# Patient Record
Sex: FEMALE | Race: WHITE | NOT HISPANIC OR LATINO | Employment: UNEMPLOYED | URBAN - METROPOLITAN AREA
[De-identification: names, ages, dates, MRNs, and addresses within clinical notes are randomized per-mention and may not be internally consistent; named-entity substitution may affect disease eponyms.]

---

## 2018-01-16 ENCOUNTER — OFFICE VISIT CONVERTED (OUTPATIENT)
Dept: NEUROSURGERY | Facility: CLINIC | Age: 53
End: 2018-01-16
Attending: NEUROLOGICAL SURGERY

## 2019-07-25 ENCOUNTER — HOSPITAL ENCOUNTER (OUTPATIENT)
Dept: GENERAL RADIOLOGY | Facility: HOSPITAL | Age: 54
Discharge: HOME | End: 2019-07-25
Attending: NURSE PRACTITIONER

## 2020-05-14 ENCOUNTER — HOSPITAL ENCOUNTER (OUTPATIENT)
Dept: GENERAL RADIOLOGY | Facility: HOSPITAL | Age: 55
Discharge: HOME | End: 2020-05-14
Attending: NURSE PRACTITIONER

## 2020-05-14 LAB
ALBUMIN SERPL-MCNC: 4.4 G/DL (ref 3.5–5)
ALBUMIN/GLOB SERPL: 1.5 {RATIO} (ref 1.4–2.6)
ALP SERPL-CCNC: 86 U/L (ref 53–141)
ALT SERPL-CCNC: 14 U/L (ref 10–40)
ANION GAP SERPL CALC-SCNC: 16 MMOL/L (ref 8–19)
AST SERPL-CCNC: 21 U/L (ref 15–50)
BILIRUB SERPL-MCNC: <0.15 MG/DL (ref 0.2–1.3)
BUN SERPL-MCNC: 13 MG/DL (ref 5–25)
BUN/CREAT SERPL: 20 {RATIO} (ref 6–20)
CALCIUM SERPL-MCNC: 9.5 MG/DL (ref 8.7–10.4)
CHLORIDE SERPL-SCNC: 105 MMOL/L (ref 99–111)
CONV CO2: 26 MMOL/L (ref 22–32)
CONV TOTAL PROTEIN: 7.3 G/DL (ref 6.3–8.2)
CREAT UR-MCNC: 0.65 MG/DL (ref 0.5–0.9)
GFR SERPLBLD BASED ON 1.73 SQ M-ARVRAT: >60 ML/MIN/{1.73_M2}
GLOBULIN UR ELPH-MCNC: 2.9 G/DL (ref 2–3.5)
GLUCOSE SERPL-MCNC: 105 MG/DL (ref 65–99)
OSMOLALITY SERPL CALC.SUM OF ELEC: 296 MOSM/KG (ref 273–304)
POTASSIUM SERPL-SCNC: 3.5 MMOL/L (ref 3.5–5.3)
SODIUM SERPL-SCNC: 143 MMOL/L (ref 135–147)

## 2020-12-03 ENCOUNTER — HOSPITAL ENCOUNTER (OUTPATIENT)
Dept: GENERAL RADIOLOGY | Facility: HOSPITAL | Age: 55
Discharge: HOME | End: 2020-12-03
Attending: NURSE PRACTITIONER

## 2021-02-24 ENCOUNTER — HOSPITAL ENCOUNTER (OUTPATIENT)
Dept: GENERAL RADIOLOGY | Facility: HOSPITAL | Age: 56
Discharge: HOME | End: 2021-02-24
Attending: NURSE PRACTITIONER

## 2021-02-24 LAB
ALBUMIN SERPL-MCNC: 4.2 G/DL (ref 3.5–5)
ALBUMIN/GLOB SERPL: 1.4 {RATIO} (ref 1.4–2.6)
ALP SERPL-CCNC: 109 U/L (ref 53–141)
ALT SERPL-CCNC: 9 U/L (ref 10–40)
ANION GAP SERPL CALC-SCNC: 17 MMOL/L (ref 8–19)
AST SERPL-CCNC: 19 U/L (ref 15–50)
BASOPHILS # BLD AUTO: 0.06 10*3/UL (ref 0–0.2)
BASOPHILS NFR BLD AUTO: 0.9 % (ref 0–3)
BILIRUB SERPL-MCNC: 0.16 MG/DL (ref 0.2–1.3)
BUN SERPL-MCNC: 22 MG/DL (ref 5–25)
BUN/CREAT SERPL: 28 {RATIO} (ref 6–20)
CALCIUM SERPL-MCNC: 9.6 MG/DL (ref 8.7–10.4)
CHLORIDE SERPL-SCNC: 101 MMOL/L (ref 99–111)
CONV ABS IMM GRAN: 0.02 10*3/UL (ref 0–0.2)
CONV CO2: 27 MMOL/L (ref 22–32)
CONV IMMATURE GRAN: 0.3 % (ref 0–1.8)
CONV TOTAL PROTEIN: 7.1 G/DL (ref 6.3–8.2)
CREAT UR-MCNC: 0.78 MG/DL (ref 0.5–0.9)
DEPRECATED RDW RBC AUTO: 40.1 FL (ref 36.4–46.3)
EOSINOPHIL # BLD AUTO: 0.2 10*3/UL (ref 0–0.7)
EOSINOPHIL # BLD AUTO: 2.9 % (ref 0–7)
ERYTHROCYTE [DISTWIDTH] IN BLOOD BY AUTOMATED COUNT: 12.5 % (ref 11.7–14.4)
GFR SERPLBLD BASED ON 1.73 SQ M-ARVRAT: >60 ML/MIN/{1.73_M2}
GLOBULIN UR ELPH-MCNC: 2.9 G/DL (ref 2–3.5)
GLUCOSE SERPL-MCNC: 92 MG/DL (ref 65–99)
HCT VFR BLD AUTO: 37.6 % (ref 37–47)
HGB BLD-MCNC: 12.1 G/DL (ref 12–16)
LYMPHOCYTES # BLD AUTO: 2.97 10*3/UL (ref 1–5)
LYMPHOCYTES NFR BLD AUTO: 43.6 % (ref 20–45)
MCH RBC QN AUTO: 28.5 PG (ref 27–31)
MCHC RBC AUTO-ENTMCNC: 32.2 G/DL (ref 33–37)
MCV RBC AUTO: 88.5 FL (ref 81–99)
MONOCYTES # BLD AUTO: 0.48 10*3/UL (ref 0.2–1.2)
MONOCYTES NFR BLD AUTO: 7 % (ref 3–10)
NEUTROPHILS # BLD AUTO: 3.08 10*3/UL (ref 2–8)
NEUTROPHILS NFR BLD AUTO: 45.3 % (ref 30–85)
NRBC CBCN: 0 % (ref 0–0.7)
OSMOLALITY SERPL CALC.SUM OF ELEC: 295 MOSM/KG (ref 273–304)
PLATELET # BLD AUTO: 343 10*3/UL (ref 130–400)
PMV BLD AUTO: 9.6 FL (ref 9.4–12.3)
POTASSIUM SERPL-SCNC: 3.8 MMOL/L (ref 3.5–5.3)
RBC # BLD AUTO: 4.25 10*6/UL (ref 4.2–5.4)
SODIUM SERPL-SCNC: 141 MMOL/L (ref 135–147)
T4 FREE SERPL-MCNC: 0.9 NG/DL (ref 0.9–1.8)
TSH SERPL-ACNC: 1.83 M[IU]/L (ref 0.27–4.2)
WBC # BLD AUTO: 6.81 10*3/UL (ref 4.8–10.8)

## 2021-04-14 ENCOUNTER — HOSPITAL ENCOUNTER (OUTPATIENT)
Dept: GENERAL RADIOLOGY | Facility: HOSPITAL | Age: 56
Discharge: HOME | End: 2021-04-14
Attending: NURSE PRACTITIONER

## 2021-04-14 LAB
CHOLEST SERPL-MCNC: 221 MG/DL (ref 107–200)
CHOLEST/HDLC SERPL: 4.3 {RATIO} (ref 3–6)
HDLC SERPL-MCNC: 51 MG/DL (ref 40–60)
LDLC SERPL CALC-MCNC: 123 MG/DL (ref 70–100)
TRIGL SERPL-MCNC: 234 MG/DL (ref 40–150)
VLDLC SERPL-MCNC: 47 MG/DL (ref 5–37)

## 2021-05-16 VITALS
WEIGHT: 209 LBS | BODY MASS INDEX: 33.59 KG/M2 | HEIGHT: 66 IN | DIASTOLIC BLOOD PRESSURE: 87 MMHG | SYSTOLIC BLOOD PRESSURE: 126 MMHG

## 2022-05-24 ENCOUNTER — TRANSCRIBE ORDERS (OUTPATIENT)
Dept: ADMINISTRATIVE | Facility: HOSPITAL | Age: 57
End: 2022-05-24

## 2022-05-24 DIAGNOSIS — M54.40 LOW BACK PAIN WITH SCIATICA, SCIATICA LATERALITY UNSPECIFIED, UNSPECIFIED BACK PAIN LATERALITY, UNSPECIFIED CHRONICITY: Primary | ICD-10-CM

## 2022-06-17 ENCOUNTER — APPOINTMENT (OUTPATIENT)
Dept: MRI IMAGING | Facility: HOSPITAL | Age: 57
End: 2022-06-17

## 2023-09-23 ENCOUNTER — HOSPITAL ENCOUNTER (EMERGENCY)
Facility: HOSPITAL | Age: 58
Discharge: HOME | End: 2023-09-23
Attending: EMERGENCY MEDICINE
Payer: COMMERCIAL

## 2023-09-23 ENCOUNTER — APPOINTMENT (OUTPATIENT)
Dept: GENERAL RADIOLOGY | Facility: HOSPITAL | Age: 58
End: 2023-09-23
Payer: COMMERCIAL

## 2023-09-23 ENCOUNTER — APPOINTMENT (OUTPATIENT)
Dept: CT IMAGING | Facility: HOSPITAL | Age: 58
End: 2023-09-23
Payer: COMMERCIAL

## 2023-09-23 VITALS
OXYGEN SATURATION: 92 % | WEIGHT: 215 LBS | HEIGHT: 66 IN | DIASTOLIC BLOOD PRESSURE: 82 MMHG | BODY MASS INDEX: 34.55 KG/M2 | SYSTOLIC BLOOD PRESSURE: 131 MMHG | RESPIRATION RATE: 20 BRPM | TEMPERATURE: 98.9 F | HEART RATE: 90 BPM

## 2023-09-23 DIAGNOSIS — R07.89 CHEST WALL PAIN: ICD-10-CM

## 2023-09-23 DIAGNOSIS — R07.89 CHEST DISCOMFORT: ICD-10-CM

## 2023-09-23 DIAGNOSIS — U07.1 COVID-19: ICD-10-CM

## 2023-09-23 DIAGNOSIS — R55 SYNCOPE, UNSPECIFIED SYNCOPE TYPE: Primary | ICD-10-CM

## 2023-09-23 DIAGNOSIS — F14.90 COCAINE USE: ICD-10-CM

## 2023-09-23 LAB
ALBUMIN SERPL-MCNC: 3.9 G/DL (ref 3.5–5.2)
ALBUMIN/GLOB SERPL: 1.5 G/DL
ALP SERPL-CCNC: 120 U/L (ref 39–117)
ALT SERPL W P-5'-P-CCNC: 11 U/L (ref 1–33)
AMPHET+METHAMPHET UR QL: NEGATIVE
ANION GAP SERPL CALCULATED.3IONS-SCNC: 11.9 MMOL/L (ref 5–15)
AST SERPL-CCNC: 19 U/L (ref 1–32)
BARBITURATES UR QL SCN: NEGATIVE
BASOPHILS # BLD AUTO: 0.04 10*3/MM3 (ref 0–0.2)
BASOPHILS NFR BLD AUTO: 0.4 % (ref 0–1.5)
BENZODIAZ UR QL SCN: POSITIVE
BILIRUB SERPL-MCNC: 0.2 MG/DL (ref 0–1.2)
BUN SERPL-MCNC: 11 MG/DL (ref 6–20)
BUN/CREAT SERPL: 14.5 (ref 7–25)
CALCIUM SPEC-SCNC: 9 MG/DL (ref 8.6–10.5)
CANNABINOIDS SERPL QL: NEGATIVE
CHLORIDE SERPL-SCNC: 105 MMOL/L (ref 98–107)
CO2 SERPL-SCNC: 23.1 MMOL/L (ref 22–29)
COCAINE UR QL: POSITIVE
CREAT SERPL-MCNC: 0.76 MG/DL (ref 0.57–1)
D DIMER PPP FEU-MCNC: 0.7 MCGFEU/ML (ref 0–0.58)
DEPRECATED RDW RBC AUTO: 45.1 FL (ref 37–54)
EGFRCR SERPLBLD CKD-EPI 2021: 91 ML/MIN/1.73
EOSINOPHIL # BLD AUTO: 0.06 10*3/MM3 (ref 0–0.4)
EOSINOPHIL NFR BLD AUTO: 0.6 % (ref 0.3–6.2)
ERYTHROCYTE [DISTWIDTH] IN BLOOD BY AUTOMATED COUNT: 13.9 % (ref 12.3–15.4)
FENTANYL UR-MCNC: NEGATIVE NG/ML
FLUAV AG NPH QL: NEGATIVE
FLUBV AG NPH QL IA: NEGATIVE
GEN 5 2HR TROPONIN T REFLEX: 7 NG/L
GLOBULIN UR ELPH-MCNC: 2.6 GM/DL
GLUCOSE SERPL-MCNC: 116 MG/DL (ref 65–99)
HCT VFR BLD AUTO: 34.8 % (ref 34–46.6)
HGB BLD-MCNC: 11.1 G/DL (ref 12–15.9)
HOLD SPECIMEN: NORMAL
HOLD SPECIMEN: NORMAL
IMM GRANULOCYTES # BLD AUTO: 0.03 10*3/MM3 (ref 0–0.05)
IMM GRANULOCYTES NFR BLD AUTO: 0.3 % (ref 0–0.5)
LIPASE SERPL-CCNC: 17 U/L (ref 13–60)
LYMPHOCYTES # BLD AUTO: 2.26 10*3/MM3 (ref 0.7–3.1)
LYMPHOCYTES NFR BLD AUTO: 23.3 % (ref 19.6–45.3)
MAGNESIUM SERPL-MCNC: 1.8 MG/DL (ref 1.6–2.6)
MCH RBC QN AUTO: 28.4 PG (ref 26.6–33)
MCHC RBC AUTO-ENTMCNC: 31.9 G/DL (ref 31.5–35.7)
MCV RBC AUTO: 89 FL (ref 79–97)
METHADONE UR QL SCN: NEGATIVE
MONOCYTES # BLD AUTO: 0.34 10*3/MM3 (ref 0.1–0.9)
MONOCYTES NFR BLD AUTO: 3.5 % (ref 5–12)
NEUTROPHILS NFR BLD AUTO: 6.99 10*3/MM3 (ref 1.7–7)
NEUTROPHILS NFR BLD AUTO: 71.9 % (ref 42.7–76)
NRBC BLD AUTO-RTO: 0 /100 WBC (ref 0–0.2)
NT-PROBNP SERPL-MCNC: 178.7 PG/ML (ref 0–900)
OPIATES UR QL: NEGATIVE
OXYCODONE UR QL SCN: NEGATIVE
PLATELET # BLD AUTO: 201 10*3/MM3 (ref 140–450)
PMV BLD AUTO: 8.9 FL (ref 6–12)
POTASSIUM SERPL-SCNC: 3.6 MMOL/L (ref 3.5–5.2)
PROT SERPL-MCNC: 6.5 G/DL (ref 6–8.5)
QT INTERVAL: 362 MS
QT INTERVAL: 375 MS
QTC INTERVAL: 472 MS
QTC INTERVAL: 473 MS
RBC # BLD AUTO: 3.91 10*6/MM3 (ref 3.77–5.28)
SARS-COV-2 RNA RESP QL NAA+PROBE: DETECTED
SODIUM SERPL-SCNC: 140 MMOL/L (ref 136–145)
TROPONIN T DELTA: 0 NG/L
TROPONIN T SERPL HS-MCNC: 7 NG/L
WBC NRBC COR # BLD: 9.72 10*3/MM3 (ref 3.4–10.8)
WHOLE BLOOD HOLD COAG: NORMAL
WHOLE BLOOD HOLD SPECIMEN: NORMAL

## 2023-09-23 PROCEDURE — 84484 ASSAY OF TROPONIN QUANT: CPT

## 2023-09-23 PROCEDURE — 93005 ELECTROCARDIOGRAM TRACING: CPT | Performed by: EMERGENCY MEDICINE

## 2023-09-23 PROCEDURE — 80307 DRUG TEST PRSMV CHEM ANLYZR: CPT | Performed by: EMERGENCY MEDICINE

## 2023-09-23 PROCEDURE — 87804 INFLUENZA ASSAY W/OPTIC: CPT | Performed by: EMERGENCY MEDICINE

## 2023-09-23 PROCEDURE — 36415 COLL VENOUS BLD VENIPUNCTURE: CPT

## 2023-09-23 PROCEDURE — 93005 ELECTROCARDIOGRAM TRACING: CPT

## 2023-09-23 PROCEDURE — 71045 X-RAY EXAM CHEST 1 VIEW: CPT

## 2023-09-23 PROCEDURE — 87635 SARS-COV-2 COVID-19 AMP PRB: CPT | Performed by: EMERGENCY MEDICINE

## 2023-09-23 PROCEDURE — 99285 EMERGENCY DEPT VISIT HI MDM: CPT

## 2023-09-23 PROCEDURE — 25510000001 IOPAMIDOL PER 1 ML: Performed by: EMERGENCY MEDICINE

## 2023-09-23 PROCEDURE — 83880 ASSAY OF NATRIURETIC PEPTIDE: CPT

## 2023-09-23 PROCEDURE — 85025 COMPLETE CBC W/AUTO DIFF WBC: CPT

## 2023-09-23 PROCEDURE — 83735 ASSAY OF MAGNESIUM: CPT

## 2023-09-23 PROCEDURE — 83690 ASSAY OF LIPASE: CPT

## 2023-09-23 PROCEDURE — 85379 FIBRIN DEGRADATION QUANT: CPT | Performed by: EMERGENCY MEDICINE

## 2023-09-23 PROCEDURE — 71260 CT THORAX DX C+: CPT

## 2023-09-23 PROCEDURE — 84484 ASSAY OF TROPONIN QUANT: CPT | Performed by: EMERGENCY MEDICINE

## 2023-09-23 PROCEDURE — 80053 COMPREHEN METABOLIC PANEL: CPT

## 2023-09-23 RX ORDER — SODIUM CHLORIDE 0.9 % (FLUSH) 0.9 %
10 SYRINGE (ML) INJECTION AS NEEDED
Status: DISCONTINUED | OUTPATIENT
Start: 2023-09-23 | End: 2023-09-24 | Stop reason: HOSPADM

## 2023-09-23 RX ORDER — ALBUTEROL SULFATE 90 UG/1
2 AEROSOL, METERED RESPIRATORY (INHALATION) EVERY 4 HOURS PRN
Qty: 1 G | Refills: 0 | Status: SHIPPED | OUTPATIENT
Start: 2023-09-23 | End: 2026-04-06

## 2023-09-23 RX ORDER — BENZONATATE 100 MG/1
200 CAPSULE ORAL 3 TIMES DAILY PRN
Qty: 30 CAPSULE | Refills: 0 | Status: SHIPPED | OUTPATIENT
Start: 2023-09-23 | End: 2023-10-03

## 2023-09-23 RX ORDER — ALBUTEROL SULFATE 90 UG/1
2 AEROSOL, METERED RESPIRATORY (INHALATION) EVERY 4 HOURS PRN
Qty: 1 G | Refills: 0 | Status: SHIPPED | OUTPATIENT
Start: 2023-09-23 | End: 2023-09-23 | Stop reason: SDUPTHER

## 2023-09-23 RX ORDER — ASPIRIN 81 MG/1
324 TABLET, CHEWABLE ORAL ONCE
Status: COMPLETED | OUTPATIENT
Start: 2023-09-23 | End: 2023-09-23

## 2023-09-23 RX ORDER — BENZONATATE 100 MG/1
200 CAPSULE ORAL 3 TIMES DAILY PRN
Qty: 30 CAPSULE | Refills: 0 | Status: SHIPPED | OUTPATIENT
Start: 2023-09-23 | End: 2023-09-23 | Stop reason: SDUPTHER

## 2023-09-23 RX ADMIN — ASPIRIN 324 MG: 81 TABLET, CHEWABLE ORAL at 19:37

## 2023-09-23 RX ADMIN — IOPAMIDOL 100 ML: 755 INJECTION, SOLUTION INTRAVENOUS at 22:01

## 2023-09-23 NOTE — ED PROVIDER NOTES
Time: 7:20 PM EDT  Date of encounter:  9/23/2023  Independent Historian/Clinical History and Information was obtained by:   Patient  Chief Complaint: Syncope    History is limited by: N/A    History of Present Illness:  Patient is a 58 y.o. year old female who presents to the emergency department for evaluation of syncope.  The patient notes that she was walking to her kitchen.  The patient started with a coughing spell the patient stated that she coughed so hard that she then lost consciousness.  The patient states she fell to the ground.  She felt that it was transient.  There is just seconds of loss of consciousness.  Patient states that she did injure her left knee.  She has no other injury.  Patient does not complain of left knee pain at this time.  She does note that she has had upper respiratory symptoms now for several weeks.  She this includes cough, shortness of breath with exertion chest pain with cough.  She states she only has chest pain with cough.  Patient was a little bit nauseated.  The patient denies any unusual fatigue or diaphoresis.  Patient denies any abdominal pain.  The patient's had no vomiting.  The patient's had no diarrhea.  Patient's had no prior DVT or pulmonary embolism.  The patient's had no previous surgery in the last 6 to 8 weeks.  The patient has no clinical signs of DVT such as unilateral leg swelling or leg pain.  The patient does not have active cancer at this time.  The patient's not on estrogen.  The patient does note that she supposed be on medicine for hypertension and cholesterol but she does not take them.  She is not a diabetic.  She was never smoker she does have family history of coronary disease    HPI    Patient Care Team  Primary Care Provider: Kassidy Acharya APRN    Past Medical History:     No Known Allergies  Past Medical History:   Diagnosis Date    Anxiety and depression     Back pain      Past Surgical History:   Procedure Laterality Date     HYSTERECTOMY      SPINAL FUSION       History reviewed. No pertinent family history.    Home Medications:  Prior to Admission medications    Medication Sig Start Date End Date Taking? Authorizing Provider   ALPRAZolam (Xanax) 0.25 MG tablet Xanax 0.25 mg oral tablet take 1 tablet (0.25 mg) by oral route 3 times per day   Active    Emergency, Nurse Kendrick RN   diclofenac (VOLTAREN) 75 MG EC tablet Take 1 tablet by mouth 2 (Two) Times a Day As Needed (Pain). 6/15/22   Mary Ann Burns APRN   Omega-3 Fatty Acids (fish oil) 1000 MG capsule capsule Take  by mouth.    Emergency, Nurse Kendrick RN   venlafaxine XR (EFFEXOR-XR) 150 MG 24 hr capsule venlafaxine 150 mg oral capsule,extended release 24hr take 1 capsule (150 mg) by oral route once daily   Active    Emergency, Nurse Kendrick RN        Social History:   Social History     Tobacco Use    Smoking status: Never    Smokeless tobacco: Never   Vaping Use    Vaping Use: Never used   Substance Use Topics    Alcohol use: Not Currently         Review of Systems:  Review of Systems   Constitutional:  Negative for chills, diaphoresis and fever.   HENT:  Negative for congestion, postnasal drip, rhinorrhea and sore throat.    Eyes:  Negative for photophobia.   Respiratory:  Positive for cough and shortness of breath. Negative for chest tightness.    Cardiovascular:  Positive for chest pain. Negative for palpitations and leg swelling.   Gastrointestinal:  Positive for nausea. Negative for abdominal pain, diarrhea and vomiting.   Genitourinary:  Negative for difficulty urinating, dysuria, flank pain, frequency, hematuria and urgency.   Musculoskeletal:  Negative for neck pain and neck stiffness.   Skin:  Negative for pallor and rash.   Neurological:  Positive for syncope. Negative for dizziness, weakness, numbness and headaches.   Hematological:  Negative for adenopathy. Does not bruise/bleed easily.   Psychiatric/Behavioral: Negative.        Physical Exam:  /82 (BP  "Location: Right arm)   Pulse 90   Temp 98.9 °F (37.2 °C) (Oral)   Resp 20   Ht 167.6 cm (66\")   Wt 97.5 kg (215 lb)   SpO2 92%   BMI 34.70 kg/m²     Physical Exam  Vitals and nursing note reviewed.   Constitutional:       General: She is not in acute distress.     Appearance: Normal appearance. She is not ill-appearing, toxic-appearing or diaphoretic.   HENT:      Head: Normocephalic and atraumatic.      Mouth/Throat:      Mouth: Mucous membranes are moist.   Eyes:      Pupils: Pupils are equal, round, and reactive to light.   Cardiovascular:      Rate and Rhythm: Normal rate and regular rhythm.      Pulses: Normal pulses.           Carotid pulses are 2+ on the right side and 2+ on the left side.       Radial pulses are 2+ on the right side and 2+ on the left side.        Femoral pulses are 2+ on the right side and 2+ on the left side.       Popliteal pulses are 2+ on the right side and 2+ on the left side.        Dorsalis pedis pulses are 2+ on the right side and 2+ on the left side.        Posterior tibial pulses are 2+ on the right side and 2+ on the left side.      Heart sounds: Normal heart sounds. No murmur heard.  Pulmonary:      Effort: Pulmonary effort is normal. No accessory muscle usage, respiratory distress or retractions.      Breath sounds: Normal breath sounds. Examination of the right-upper field reveals decreased breath sounds. Examination of the left-upper field reveals decreased breath sounds. Examination of the right-middle field reveals decreased breath sounds. Examination of the left-middle field reveals decreased breath sounds. Examination of the right-lower field reveals decreased breath sounds. Examination of the left-lower field reveals decreased breath sounds. No wheezing, rhonchi or rales.   Chest:      Chest wall: Tenderness present. No mass.          Comments: The patient's chest pain is reproduced with cough, deep breath and palpation of the anterior chest wall  Abdominal:      " General: Abdomen is flat. There is no distension.      Palpations: Abdomen is soft. There is no mass or pulsatile mass.      Tenderness: There is no abdominal tenderness. There is no right CVA tenderness, left CVA tenderness, guarding or rebound.      Comments: No rigidity   Musculoskeletal:         General: No swelling, tenderness or deformity.      Cervical back: Neck supple. No tenderness.      Right lower leg: No tenderness. No edema.      Left lower leg: No tenderness. No edema.   Skin:     General: Skin is warm and dry.      Capillary Refill: Capillary refill takes less than 2 seconds.      Coloration: Skin is not jaundiced or pale.      Findings: No erythema.   Neurological:      General: No focal deficit present.      Mental Status: She is alert and oriented to person, place, and time. Mental status is at baseline.      Cranial Nerves: Cranial nerves 2-12 are intact. No cranial nerve deficit.      Sensory: Sensation is intact. No sensory deficit.      Motor: Motor function is intact. No weakness or pronator drift.      Coordination: Coordination is intact. Coordination normal.   Psychiatric:         Mood and Affect: Mood normal.         Behavior: Behavior normal.                Procedures:  Procedures      Medical Decision Making:      Comorbidities that affect care:    Anxiety, depression, chronic back pain, hypertension, high cholesterol    External Notes reviewed:    None      The following orders were placed and all results were independently analyzed by me:  Orders Placed This Encounter   Procedures    COVID-19,CEPHEID/KEVIN,COR/EZRA/PAD/RIK/MAD IN-HOUSE(OR EMERGENT/ADD-ON),NP SWAB IN TRANSPORT MEDIA 3-4 HR TAT, RT-PCR - Swab, Nasopharynx    Influenza Antigen, Rapid - Swab, Nasopharynx    XR Chest 1 View    CT Chest With Contrast Diagnostic    [place]al Draw    High Sensitivity Troponin T    Comprehensive Metabolic Panel    Lipase    BNP    Magnesium    CBC Auto Differential    High Sensitivity Troponin T  2Hr    D-dimer, Quantitative    Urine Drug Screen - Urine, Clean Catch    Undress & Gown    Continuous Pulse Oximetry    ECG 12 Lead ED Triage Standing Order; Chest Pain    CBC & Differential    Green Top (Gel)    Lavender Top    Gold Top - SST    Light Blue Top       Medications Given in the Emergency Department:  Medications   aspirin chewable tablet 324 mg (324 mg Oral Given 9/23/23 1937)   iopamidol (ISOVUE-370) 76 % injection 100 mL (100 mL Intravenous Given 9/23/23 2201)        ED Course:    ED Course as of 09/25/23 0255   Sat Sep 23, 2023   1711 EKG:    Rhythm: Sinus tachycardia  Rate: 102  Intervals: Normal VA and QT interval  T-wave: T wave inversion III and aVF, nonspecific T wave flattening  ST Segment: The baseline artifact does obscure detail.,  There may be nonspecific ST abnormalities however, there is no obvious pathological ST elevation or reciprocal ST depression to suggest STEMI    EKG Comparison: There is no EKG available for comparison    Interpreted by me   [SD]   1859 EKG:    Rhythm: Sinus rhythm  Rate: 96  Intervals: Normal VA and QT interval  T-wave: T wave inversion III, aVF, V2, V3, nonspecific T wave flattening  ST Segment: Again, baseline artifact obscures detail.  There is no obvious pathological ST elevation or reciprocal ST depression to suggest STEMI    EKG Comparison: Probably no change in the QRS and ST morphology from the EKG is performed earlier in the department.  There was T wave flattening on the initial EKG which may have obscured the T wave inversion in the precordial leads.  There probably was some T wave inversion specifically in V2 and V3 on the initial EKG but it was mostly flattened    Interpreted by me   [SD]      ED Course User Index  [SD] Grupo Tijerina DO       Labs:    Lab Results (last 24 hours)       ** No results found for the last 24 hours. **             Imaging:    No Radiology Exams Resulted Within Past 24 Hours      Differential Diagnosis and  Discussion:    Syncope: Differential diagnosis includes but is not limited to TIA, hyperventilation, aortic stenosis, pulmonary emboli, myocardial disease, bradycardia arrhythmia, heart block, tachyarrhythmia, vasovagal, orthostatic hypotension, ruptured AAA, aortic dissection, subarachnoid hemorrhage, seizure, hypoglycemia.    All labs were reviewed and interpreted by me.  All X-rays impressions were independently interpreted by me.  EKG was interpreted by me.    MDM  Number of Diagnoses or Management Options  Chest discomfort  Chest wall pain  Cocaine use  COVID-19  Syncope, unspecified syncope type  Diagnosis management comments: Ukrainian Syncope Risk Score - MDCalc  Calculated on Sep 23 2023 7:52 PM  0 points -> Ukrainian Syncope Risk Score  Low  risk -> 1.9% risk of 30-day serious adverse event (death, arrhythmia, MI - full list in Evidence)    HEART Score for Major Cardiac Events - MDCalc  Calculated on Sep 23 2023 11:36 PM  4 points -> Moderate Score (4-6 points) Risk of MACE of 12-16.6%.    The patient had 2 high sensitivity troponins that were within normal limits.  The patient had 2 EKGs that demonstrated no evidence of ST segment elevation MI or other injury pattern    I discussed the patient's heart score with her.  According to heart score she has moderate risk for cardiovascular event over the next 4 to 6 weeks.  However current literature says that if the patient has 2 normal high-sensitivity troponins 2 hours apart that the risk is much lower.  I have discussed this with her in detail..  The patient states that she feels comfortable to go home and follow-up with cardiology on outpatient basis.  Especially in light of the fact that the patient's pain is completely reproducible with palpation of the chest wall and cough.  We will start the patient on aspirin.  The patient will follow-up with cardiology on outpatient basis to discuss the need for stress echocardiogram.  The patient will perform no  strenuous activity until released by the cardiologist.  I have also discussed the patient's positive cocaine test with her.  The patient understands importance of avoiding cocaine certainly can be a factor in causing chest discomfort and syncope.  I believe the patient's upper respiratory infection symptoms are likely related to her COVID-19.  I will place the patient on inhaler and Tessalon.  I have discussed the aorta on her CT scan that being at the upper limits of normal.  The patient will follow-up with her primary care provider to review the CT scan of the chest and discuss possible need for repeat imaging or referral to a cardiothoracic surgeon.    The patient was given very specific instructions on when and why to return to the emergency room.  The patient voiced understanding and felt comfortable with the discharge instructions.  They would return to the emergency room if necessary.  The patient appears appropriate for discharge and outpatient follow-up.         Amount and/or Complexity of Data Reviewed  Clinical lab tests: reviewed  Tests in the radiology section of CPT®: reviewed  Tests in the medicine section of CPT®: reviewed       Social Determinants of Health:    Patient is independent, reliable, and has access to care.       Disposition and Care Coordination:    Discharged: The patient is suitable and stable for discharge with no need for consideration of observation or admission.    I have explained discharge medications and the need for follow up with the patient/caretakers. This was also printed in the discharge instructions. Patient was discharged with the following medications and follow up:      Medication List        New Prescriptions      albuterol sulfate  (90 Base) MCG/ACT inhaler  Commonly known as: PROVENTIL HFA;VENTOLIN HFA;PROAIR HFA  Inhale 2 puffs Every 4 (Four) Hours As Needed for Wheezing.     benzonatate 100 MG capsule  Commonly known as: TESSALON  Take 2 capsules by mouth 3  (Three) Times a Day As Needed for Cough for up to 10 days.               Where to Get Your Medications        These medications were sent to [facility]/pharmacy #[id number] - [place], [place] - [address] - [phone]  - [phone] FX  [address] [place] [address]      Hours: 24-hours Phone: [phone]   albuterol sulfate  (90 Base) MCG/ACT inhaler  benzonatate 100 MG capsule      Kassidy Acharya APRN  [address] [place] [id number] [phone]    On 9/25/2023  Chest discomfort, COVID-19, call for appointment    Grupo Hobbs MD  [address] [place] [address] [facility] [address]  [phone]    On 9/25/2023  Call for appointment, discuss need for stress echocardiogram and Holter monitor       Final diagnoses:   Syncope, unspecified syncope type   Chest discomfort   Cocaine use   COVID-19   Chest wall pain        ED Disposition       ED Disposition   Discharge    Condition   Stable    Comment   --               This medical record created using voice recognition software.             Grupo Tijerina DO  09/25/23 0255

## 2023-09-24 NOTE — DISCHARGE INSTRUCTIONS
No strenuous activity until released by the cardiologist.  Please start on a daily baby aspirin.     Please return to the emergency room for worsening chest pain, radiating chest pain, shortness of breath, near passing out, passing out, unusual fatigue, unusual sweating, nausea or vomiting or new or worrisome symptoms    Please do not use cocaine    The maximum diameter of your ascending aorta is 3.9 cm which is the upper limits of normal.  Please review this with your primary care physician and discuss the need to reevaluate in 3 to 6 months with a CT angiogram of your chest or possible need for referral to thoracic surgery

## 2023-09-26 LAB
QT INTERVAL: 362 MS
QT INTERVAL: 375 MS
QTC INTERVAL: 472 MS
QTC INTERVAL: 473 MS

## 2023-12-15 ENCOUNTER — TRANSCRIBE ORDERS (OUTPATIENT)
Dept: ADMINISTRATIVE | Facility: HOSPITAL | Age: 58
End: 2023-12-15
Payer: COMMERCIAL

## 2023-12-15 DIAGNOSIS — M51.36 DDD (DEGENERATIVE DISC DISEASE), LUMBAR: Primary | ICD-10-CM

## 2024-01-11 ENCOUNTER — APPOINTMENT (OUTPATIENT)
Dept: CT IMAGING | Facility: HOSPITAL | Age: 59
End: 2024-01-11
Payer: COMMERCIAL

## 2024-01-11 ENCOUNTER — APPOINTMENT (OUTPATIENT)
Dept: GENERAL RADIOLOGY | Facility: HOSPITAL | Age: 59
End: 2024-01-11
Payer: COMMERCIAL

## 2024-01-11 ENCOUNTER — HOSPITAL ENCOUNTER (EMERGENCY)
Facility: HOSPITAL | Age: 59
Discharge: HOME | End: 2024-01-11
Attending: EMERGENCY MEDICINE
Payer: COMMERCIAL

## 2024-01-11 VITALS
BODY MASS INDEX: 36.32 KG/M2 | WEIGHT: 225.97 LBS | HEART RATE: 71 BPM | TEMPERATURE: 97.9 F | OXYGEN SATURATION: 98 % | HEIGHT: 66 IN | RESPIRATION RATE: 18 BRPM | SYSTOLIC BLOOD PRESSURE: 153 MMHG | DIASTOLIC BLOOD PRESSURE: 94 MMHG

## 2024-01-11 DIAGNOSIS — S09.90XA INJURY OF HEAD, INITIAL ENCOUNTER: Primary | ICD-10-CM

## 2024-01-11 DIAGNOSIS — M79.642 LEFT HAND PAIN: ICD-10-CM

## 2024-01-11 DIAGNOSIS — M25.512 LEFT SHOULDER PAIN, UNSPECIFIED CHRONICITY: ICD-10-CM

## 2024-01-11 DIAGNOSIS — T07.XXXA MULTIPLE CONTUSIONS: ICD-10-CM

## 2024-01-11 DIAGNOSIS — R07.89 LEFT-SIDED CHEST WALL PAIN: ICD-10-CM

## 2024-01-11 PROCEDURE — 73030 X-RAY EXAM OF SHOULDER: CPT

## 2024-01-11 PROCEDURE — 71101 X-RAY EXAM UNILAT RIBS/CHEST: CPT

## 2024-01-11 PROCEDURE — 70450 CT HEAD/BRAIN W/O DYE: CPT

## 2024-01-11 PROCEDURE — 73130 X-RAY EXAM OF HAND: CPT

## 2024-01-11 PROCEDURE — 99284 EMERGENCY DEPT VISIT MOD MDM: CPT

## 2024-01-11 RX ORDER — ACETAMINOPHEN 325 MG/1
650 TABLET ORAL ONCE
Status: COMPLETED | OUTPATIENT
Start: 2024-01-11 | End: 2024-01-11

## 2024-01-11 RX ORDER — TIZANIDINE 4 MG/1
TABLET ORAL EVERY 8 HOURS SCHEDULED
COMMUNITY

## 2024-01-11 RX ORDER — GABAPENTIN 300 MG/1
CAPSULE ORAL
COMMUNITY
Start: 2024-01-08

## 2024-01-11 RX ORDER — DIAZEPAM 5 MG/1
TABLET ORAL
COMMUNITY
Start: 2024-01-08

## 2024-01-11 RX ADMIN — ACETAMINOPHEN 650 MG: 325 TABLET ORAL at 14:16

## 2024-01-11 NOTE — DISCHARGE INSTRUCTIONS
Your x-rays today did not show any acute findings.  There appears to be degenerative changes in both your shoulder and your hand.  You CT scan of your head also had no acute findings.    Take pain medications as prescribed.  You can take Tylenol for additional pain relief.  Use the incentive spirometer hourly while awake.  Apply warm moist compresses alternating with cold packs several times per day.    Return for worsening symptoms such as severe headache, blurry vision, nausea/vomiting, confusion or lethargy, or any new concerns.

## 2024-01-11 NOTE — ED PROVIDER NOTES
Time: 12:27 PM EST  Date of encounter:  1/11/2024  Independent Historian/Clinical History and Information was obtained by:   Patient    History is limited by: N/A    Chief Complaint: Fall      History of Present Illness:  Patient is a 58 y.o. year old female who presents to the emergency department complaining by her neighbor for evaluation after a fall.  The patient states that she slipped on a wet floor last night and fell onto her left side.  States she hit her head but did not lose consciousness.  Complains of left chest pain, left shoulder pain, left hand pain.  Has been ambulatory but complains that her left chest hurts with every breath.    HPI    Patient Care Team  Primary Care Provider: Taina Cheema APRN    Past Medical History:     No Known Allergies  Past Medical History:   Diagnosis Date    Anxiety and depression     Back pain      Past Surgical History:   Procedure Laterality Date    HYSTERECTOMY      SPINAL FUSION       History reviewed. No pertinent family history.    Home Medications:  Prior to Admission medications    Medication Sig Start Date End Date Taking? Authorizing Provider   albuterol sulfate  (90 Base) MCG/ACT inhaler Inhale 2 puffs Every 4 (Four) Hours As Needed for Wheezing. 9/23/23 4/6/26  [employer],    ALPRAZolam (Xanax) 0.25 MG tablet Xanax 0.25 mg oral tablet take 1 tablet (0.25 mg) by oral route 3 times per day   Active    Emergency, Nurse Kendrick RN   diclofenac (VOLTAREN) 75 MG EC tablet Take 1 tablet by mouth 2 (Two) Times a Day As Needed (Pain). 6/15/22   Mary Ann Burns APRN   Omega-3 Fatty Acids (fish oil) 1000 MG capsule capsule Take  by mouth.    Emergency, Nurse Kendrick RN   venlafaxine XR (EFFEXOR-XR) 150 MG 24 hr capsule venlafaxine 150 mg oral capsule,extended release 24hr take 1 capsule (150 mg) by oral route once daily   Active    Emergency, Nurse Kendrick RN        Social History:   Social History     Tobacco Use    Smoking status: Never     "Smokeless tobacco: Never   Vaping Use    Vaping Use: Never used   Substance Use Topics    Alcohol use: Not Currently         Review of Systems:  Review of Systems   Constitutional:  Negative for chills and fever.   HENT:  Negative for congestion, ear pain and sore throat.    Eyes:  Negative for pain.   Respiratory:  Negative for cough, chest tightness and shortness of breath.    Cardiovascular:  Negative for chest pain.   Gastrointestinal:  Negative for abdominal pain, diarrhea, nausea and vomiting.   Genitourinary:  Negative for flank pain and hematuria.   Musculoskeletal:  Positive for arthralgias and myalgias. Negative for joint swelling.   Skin:  Positive for color change. Negative for pallor.   Neurological:  Negative for seizures and headaches.   All other systems reviewed and are negative.       Physical Exam:  /94 (BP Location: Left arm, Patient Position: Sitting)   Pulse 71   Temp 97.9 °F (36.6 °C) (Oral)   Resp 18   Ht 167.6 cm (66\")   Wt 102 kg (225 lb 15.5 oz)   SpO2 98%   BMI 36.47 kg/m²     Physical Exam  Vitals and nursing note reviewed.   Constitutional:       General: She is not in acute distress.     Appearance: Normal appearance. She is not toxic-appearing.   HENT:      Head: Normocephalic.        Nose: Nose normal.      Mouth/Throat:      Lips: Pink.      Mouth: Mucous membranes are moist.   Eyes:      General: No scleral icterus.  Cardiovascular:      Rate and Rhythm: Normal rate and regular rhythm.      Pulses:           Radial pulses are 2+ on the right side and 2+ on the left side.      Heart sounds: Normal heart sounds.   Pulmonary:      Effort: Pulmonary effort is normal. No respiratory distress.      Breath sounds: Normal breath sounds. No wheezing or rhonchi.   Chest:       Abdominal:      General: Abdomen is protuberant. Bowel sounds are normal.      Palpations: Abdomen is soft.      Tenderness: There is no abdominal tenderness.   Musculoskeletal:         General: Normal " range of motion.      Left shoulder: Tenderness present.      Left hand: Swelling (And ecchymosis) and tenderness present. Normal pulse.      Cervical back: Normal range of motion and neck supple.   Skin:     General: Skin is warm and dry.   Neurological:      Mental Status: She is alert and oriented to person, place, and time. Mental status is at baseline.                  Procedures:  Procedures      Medical Decision Making:      Comorbidities that affect care:    None    External Notes reviewed:          The following orders were placed and all results were independently analyzed by me:  Orders Placed This Encounter   Procedures    XR Ribs Left With PA Chest    XR Shoulder 2+ View Left    XR Hand 3+ View Left    CT Head Without Contrast       Medications Given in the Emergency Department:  Medications   acetaminophen (TYLENOL) tablet 650 mg (650 mg Oral Given 1/11/24 1416)        ED Course:         Labs:    Lab Results (last 24 hours)       ** No results found for the last 24 hours. **             Imaging:    XR Shoulder 2+ View Left    Result Date: 1/11/2024  PROCEDURE: XR SHOULDER 2+ VW LEFT  COMPARISON: None  INDICATIONS: POSTERIOR LEFT SHOULDER PAIN AFTER FALL LAST NIGHT  FINDINGS:  Mineralization appears within normal limits.  No acute malalignment.  There is mild osteophyte formation with mild to moderate glenohumeral joint space narrowing.  Lucencies are seen at the superior humeral head cartilage margin and could represent subchondral cysts versus erosions.  There appear to be moderate degenerative changes at the acromioclavicular joint.  No definite displaced fracture or acute soft tissue abnormality.        1. No radiographic findings of acute osseous shoulder abnormality. 2. Mild-to-moderate glenohumeral arthropathy with findings of osteoarthritis and possible cysts versus erosions at the superior humeral head.  Erosions could indicate a component of an inflammatory arthropathy. 3. Moderate  degenerative changes at the acromioclavicular joint.      ANNETTA CASTILLO MD       Electronically Signed and Approved By: ANNETTA CASTILLO MD on 1/11/2024 at 13:53             XR Ribs Left With PA Chest    Result Date: 1/11/2024  PROCEDURE: XR RIBS LEFT W PA CHEST  COMPARISON: [facility], CR, XR RIBS LEFT W PA CHEST, 6/15/2022, 18:20.  [facility], CR, XR CHEST 1 VW, 9/23/2023, 17:12.  [facility], CT, CT CHEST W CONTRAST DIAGNOSTIC, 9/23/2023, 21:49.  INDICATIONS: ANTERIOR LEFT CHEST PAIN AFTER FALL LAST NIGHT  FINDINGS:  Heart size appears within normal limits.  There is tortuosity of the thoracic aorta, as before.  No pleural effusion or pneumothorax.  No definite suspicious pulmonary infiltrate.  Multiple surgical clips are seen in the upper abdomen, as before.  No radiographic findings of acute displaced rib fracture.        1. No radiographic findings of acute displaced left rib fracture.  A subtle nondisplaced fracture could be radiographically occult. 2. No acute cardiopulmonary abnormality.     ANNETTA CASTILLO MD       Electronically Signed and Approved By: ANNETTA CASTILLO MD on 1/11/2024 at 13:49             XR Hand 3+ View Left    Result Date: 1/11/2024  PROCEDURE: XR HAND 3+ VW LEFT  COMPARISON: [facility], CR, HAND >OR= 3V LT, 12/03/2020, 16:59.  INDICATIONS: GENERALIZED LEFT HAND PAIN AFTER FALL LAST NIGHT  FINDINGS:  Arthrodesis hardware is seen at the 4th PIP joint with apparent bridging osseous union, as before.  No definite new hardware complication.  Osseous alignment appears grossly unchanged.  No acute focal malalignment.  Mineralization appears within normal limits.  There is advanced arthropathy at the 2nd and 3rd DIP joints and mild to moderate arthropathy at the remaining interphalangeal and 1st CMC joints, as before.  No acute fracture is seen.  Soft tissues appear unremarkable.        1. No radiographic findings of acute osseous hand  abnormality. 2. Multifocal arthropathy, as above, similar to prior radiographs.  3. Status post fusion of the 4th PIP joint.      ANNETTA CASTILLO MD       Electronically Signed and Approved By: ANNETTA CASTILLO MD on 1/11/2024 at 13:46             CT Head Without Contrast    Result Date: 1/11/2024  PROCEDURE: CT HEAD WO CONTRAST  COMPARISON:  [facility], CT, HEAD W/O CSPINE W/O CONTRAST, 5/11/2020, 20:20. INDICATIONS: Fall, head injury  PROTOCOL:   Standard imaging protocol performed    RADIATION:   DLP: 780 mGy*cm   MA and/or KV was adjusted to minimize radiation dose.     TECHNIQUE: After obtaining the patient's consent, CT images were obtained without non-ionic intravenous contrast material.  FINDINGS:  There is no evidence for acute intracranial hemorrhage. No definitive acute focal ischemia is observed. There is no evidence for abnormal cerebral edema. No mass effect or midline shift is seen. The ventricular system is nondilated. The basilar cisterns are patent. The skull is intact without displaced fracture. The paranasal sinuses and mastoid air cells are clear.  Incidental chronic soft tissue calcifications are noted involving the facial soft tissues bilaterally.        1. No evidence for acute intracranial abnormality.     GIL GILES MD       Electronically Signed and Approved By: GIL GILES MD on 1/11/2024 at 13:27                Differential Diagnosis and Discussion:    Orthopedic Injuries: Differential diagnosis includes but is not limited to fractures, soft tissue injuries, dislocations, contusions, ligamentous injuries, tendon injuries, nerve injuries, compartment syndrome, bursitis, and vascular injuries.  Trauma:  Differential diagnosis considered but not limited to were subarachnoid hemorrhage, intracranial bleeding, pneumothorax, cardiac contusion, lung contusion, intra-abdominal bleeding, and compartment syndrome of any extremity or other significant traumatic pathology    All  X-rays impressions were independently interpreted by me.  CT scan radiology impression was interpreted by me.    MDM  Number of Diagnoses or Management Options  Injury of head, initial encounter  Left hand pain  Left shoulder pain, unspecified chronicity  Left-sided chest wall pain  Multiple contusions  Diagnosis management comments: The patient presents with an acute closed head injury. Henderson Criteria for CT scan was followed. CT of the head is required for patients with minor head injury and any one of the following (including a GCS of 15):     1.                     Headache   2.                     Vomiting   3.                     Older than 60 years   4.                     Drug or Alcohol intoxication   5.                     Persistent anterograde amnesia   6.                     Visible trauma above the clavicle   7.                     Seizure.      The CT scan of the head shows no acute intracranial abnormalities. This includes subarachnoid hemorrhage, subdural hematoma, epidural hematoma, or calvarial fractures. The patient is neurologically intact, has a normal mental status and is ambulatory in the ED. The patient has had no seizure like activity in the ED. there were no acute findings on hand, shoulder, chest x-ray.  The vital signs have been stable. The patient's condition is stable and appropriate for discharge. The patient made aware of the symptoms of post-concussive syndrome. The patient was counseled to return to the ED for motor or sensory deficit, altered mental status, uncontrollable headache, visual changes, seizures, or for any re-examination of new symptoms. The patient will pursue further outpatient evaluation with the primary care physician or other designated or consulting position as indicated in the discharge instructions as a follow up.       Amount and/or Complexity of Data Reviewed  Tests in the radiology section of CPT®: reviewed and ordered  Decide to obtain previous  medical records or to obtain history from someone other than the patient: yes    Risk of Complications, Morbidity, and/or Mortality  Presenting problems: moderate  Diagnostic procedures: low  Management options: minimal    Patient Progress  Patient progress: stable                 Patient Care Considerations:    NARCOTICS: I considered prescribing opiate pain medication as an outpatient, however pain improved after Tylenol.      Consultants/Shared Management Plan:        Social Determinants of Health:    Patient is independent, reliable, and has access to care.       Disposition and Care Coordination:    Discharged: The patient is suitable and stable for discharge with no need for consideration of observation or admission.        Final diagnoses:   Injury of head, initial encounter   Left shoulder pain, unspecified chronicity   Left hand pain   Left-sided chest wall pain   Multiple contusions        ED Disposition       ED Disposition   Discharge    Condition   Stable    Comment   --               This medical record created using voice recognition software.             Madisyn Ortega, APRN  01/11/24 1948

## 2024-02-07 ENCOUNTER — OFFICE VISIT (OUTPATIENT)
Dept: CARDIOLOGY | Facility: CLINIC | Age: 59
End: 2024-02-07
Payer: COMMERCIAL

## 2024-02-07 VITALS
SYSTOLIC BLOOD PRESSURE: 146 MMHG | BODY MASS INDEX: 34.39 KG/M2 | HEIGHT: 66 IN | HEART RATE: 90 BPM | WEIGHT: 214 LBS | DIASTOLIC BLOOD PRESSURE: 112 MMHG

## 2024-02-07 DIAGNOSIS — R94.31 ABNORMAL EKG: ICD-10-CM

## 2024-02-07 DIAGNOSIS — R55 SYNCOPE AND COLLAPSE: ICD-10-CM

## 2024-02-07 DIAGNOSIS — I10 HYPERTENSION, ESSENTIAL: Primary | ICD-10-CM

## 2024-02-07 PROCEDURE — 1160F RVW MEDS BY RX/DR IN RCRD: CPT | Performed by: INTERNAL MEDICINE

## 2024-02-07 PROCEDURE — 1159F MED LIST DOCD IN RCRD: CPT | Performed by: INTERNAL MEDICINE

## 2024-02-07 PROCEDURE — 99204 OFFICE O/P NEW MOD 45 MIN: CPT | Performed by: INTERNAL MEDICINE

## 2024-02-07 RX ORDER — CARVEDILOL 6.25 MG/1
6.25 TABLET ORAL 2 TIMES DAILY WITH MEALS
Qty: 180 TABLET | Refills: 3 | Status: SHIPPED | OUTPATIENT
Start: 2024-02-07

## 2024-02-07 NOTE — PROGRESS NOTES
Chief Complaint  Chest Pain    Subjective            Anjali Medina presents to [facility] CARDIOLOGY  Chest Pain   Associated symptoms include syncope.       58-year-old white female.  She was seen in the emergency room in September with syncope.  She reports she was at home, she felt lightheaded nauseated for about an hour and then apparently had syncope.  She had 1 other episode where she fell backwards walking up the steps but does not remember much about that.  Her emergency room workup included negative cardiac markers, normal BNP, negative CTA chest.  Her UDS was positive for cocaine and benzo.  She does report using cocaine but by her description just once.  She does have a family history of heart disease in her mother.    PMH  Past Medical History:   Diagnosis Date    Anxiety and depression     Back pain          SURGICALHX  Past Surgical History:   Procedure Laterality Date    HYSTERECTOMY      SPINAL FUSION          SOC  Social History     Socioeconomic History    Marital status:    Tobacco Use    Smoking status: Never    Smokeless tobacco: Never   Vaping Use    Vaping Use: Never used   Substance and Sexual Activity    Alcohol use: Not Currently    Drug use: Never    Sexual activity: Defer         FAMHX  History reviewed. No pertinent family history.       ALLERGY  No Known Allergies     MEDSCURRENT    Current Outpatient Medications:     albuterol sulfate  (90 Base) MCG/ACT inhaler, Inhale 2 puffs Every 4 (Four) Hours As Needed for Wheezing., Disp: 1 g, Rfl: 0    ALPRAZolam (Xanax) 0.25 MG tablet, Xanax 0.25 mg oral tablet take 1 tablet (0.25 mg) by oral route 3 times per day   Active, Disp: , Rfl:     diazePAM (VALIUM) 5 MG tablet, , Disp: , Rfl:     Diclofenac Sodium (VOLTAREN) 1 % gel gel, Apply 4 g topically to the appropriate area as directed 4 (Four) Times a Day As Needed (Moderate pain)., Disp: 150 g, Rfl: 0    gabapentin (NEURONTIN) 300 MG capsule, , Disp: , Rfl:  "    Omega-3 Fatty Acids (fish oil) 1000 MG capsule capsule, Take  by mouth., Disp: , Rfl:     tiZANidine (ZANAFLEX) 4 MG tablet, Every 8 (Eight) Hours., Disp: , Rfl:     venlafaxine XR (EFFEXOR-XR) 150 MG 24 hr capsule, venlafaxine 150 mg oral capsule,extended release 24hr take 1 capsule (150 mg) by oral route once daily   Active, Disp: , Rfl:     carvedilol (Coreg) 6.25 MG tablet, Take 1 tablet by mouth 2 (Two) Times a Day With Meals., Disp: 180 tablet, Rfl: 3      Review of Systems   Constitutional: Negative.   HENT: Negative.     Eyes: Negative.    Cardiovascular:  Positive for chest pain and syncope.   Respiratory: Negative.     Endocrine: Negative.    Hematologic/Lymphatic: Negative.    Skin: Negative.    Musculoskeletal: Negative.    Gastrointestinal: Negative.    Genitourinary: Negative.    Psychiatric/Behavioral: Negative.          Objective     BP (!) 146/112   Pulse 90   Ht 167.6 cm (66\")   Wt 97.1 kg (214 lb)   BMI 34.54 kg/m²       General Appearance:   well developed  well nourished  HENT:   oropharynx moist  lips not cyanotic  Neck:  thyroid not enlarged  supple  Respiratory:  no respiratory distress  normal breath sounds  no rales  Cardiovascular:  no jugular venous distention  regular rhythm  apical impulse normal  S1 normal, S2 normal  no S3, no S4   no murmur  no rub, no thrill  carotid pulses normal; no bruit  pedal pulses normal  lower extremity edema: none    Musculoskeletal:  no clubbing of fingers.   normocephalic, head atraumatic  Skin:   warm, dry  Psychiatric:  judgement and insight appropriate  normal mood and affect      Result Review :     The following data was reviewed by: Jhon Goodwin MD on 02/07/2024:    CMP          9/23/2023    17:51   CMP   Glucose 116    BUN 11    Creatinine 0.76    EGFR 91.0    Sodium 140    Potassium 3.6    Chloride 105    Calcium 9.0    Total Protein 6.5    Albumin 3.9    Globulin 2.6    Total Bilirubin 0.2    Alkaline Phosphatase 120    AST " (SGOT) 19    ALT (SGPT) 11    Albumin/Globulin Ratio 1.5    BUN/Creatinine Ratio 14.5    Anion Gap 11.9      CBC          9/23/2023    17:51   CBC   WBC 9.72    RBC 3.91    Hemoglobin 11.1    Hematocrit 34.8    MCV 89.0    MCH 28.4    MCHC 31.9    RDW 13.9    Platelets 201            Data reviewed : Emergency room records reviewed.  EKG reviewed from September 2023 that showed sinus rhythm rate 96 with anterior T wave abnormality      Procedures             Assessment and Plan        ASSESSMENT:  Encounter Diagnoses   Name Primary?    Hypertension, essential Yes    Syncope and collapse     Abnormal EKG          PLAN:    1.  Syncope-etiology is unclear.  This happened back in September and has not been a pattern of symptoms.  She did have an abnormal EKG with some T wave changes anteriorly.  A stress echo will be scheduled to evaluate for underlying structural or ischemic abnormalities.  2.  Essential hypertension-uncontrolled.  I am getting her scheduled for the stress echo but after that 1 starting her on carvedilol 6.25 mg twice daily.  I have also told her not to use any further drugs if possible as this may have had a role in her passing out.  3.  Follow-up in about 6 weeks, we will discuss diagnostic results when available          Patient was given instructions and counseling regarding her condition or for health maintenance advice. Please see specific information pulled into the AVS if appropriate.             YULIET Goodwin MD  2/7/2024    13:32 EST

## 2024-05-03 ENCOUNTER — HOSPITAL ENCOUNTER (EMERGENCY)
Facility: HOSPITAL | Age: 59
Discharge: PSYCHIATRIC OR SUBSTANCE USE FACILITY | DRG: 881 | End: 2024-05-04
Attending: EMERGENCY MEDICINE
Payer: COMMERCIAL

## 2024-05-03 VITALS
BODY MASS INDEX: 34.72 KG/M2 | HEIGHT: 66 IN | HEART RATE: 100 BPM | TEMPERATURE: 98 F | DIASTOLIC BLOOD PRESSURE: 93 MMHG | OXYGEN SATURATION: 100 % | WEIGHT: 216.05 LBS | SYSTOLIC BLOOD PRESSURE: 149 MMHG | RESPIRATION RATE: 18 BRPM

## 2024-05-03 DIAGNOSIS — R45.851 SUICIDAL IDEATION: Primary | ICD-10-CM

## 2024-05-03 PROBLEM — F32.A DEPRESSION: Status: ACTIVE | Noted: 2024-05-03

## 2024-05-03 LAB
ALBUMIN SERPL-MCNC: 3.9 G/DL (ref 3.5–5.2)
ALBUMIN/GLOB SERPL: 1.3 G/DL
ALP SERPL-CCNC: 135 U/L (ref 39–117)
ALT SERPL W P-5'-P-CCNC: 12 U/L (ref 1–33)
AMPHET+METHAMPHET UR QL: NEGATIVE
ANION GAP SERPL CALCULATED.3IONS-SCNC: 14.1 MMOL/L (ref 5–15)
APAP SERPL-MCNC: <5 MCG/ML (ref 0–30)
AST SERPL-CCNC: 15 U/L (ref 1–32)
BARBITURATES UR QL SCN: NEGATIVE
BASOPHILS # BLD AUTO: 0.09 10*3/MM3 (ref 0–0.2)
BASOPHILS NFR BLD AUTO: 0.8 % (ref 0–1.5)
BENZODIAZ UR QL SCN: POSITIVE
BILIRUB SERPL-MCNC: 0.3 MG/DL (ref 0–1.2)
BUN SERPL-MCNC: 16 MG/DL (ref 6–20)
BUN/CREAT SERPL: 20.3 (ref 7–25)
CALCIUM SPEC-SCNC: 8.6 MG/DL (ref 8.6–10.5)
CANNABINOIDS SERPL QL: NEGATIVE
CHLORIDE SERPL-SCNC: 105 MMOL/L (ref 98–107)
CLUMPED PLATELETS: PRESENT
CO2 SERPL-SCNC: 22.9 MMOL/L (ref 22–29)
COCAINE UR QL: POSITIVE
CREAT SERPL-MCNC: 0.79 MG/DL (ref 0.57–1)
DEPRECATED RDW RBC AUTO: 42.9 FL (ref 37–54)
EGFRCR SERPLBLD CKD-EPI 2021: 86.8 ML/MIN/1.73
EOSINOPHIL # BLD AUTO: 0.07 10*3/MM3 (ref 0–0.4)
EOSINOPHIL NFR BLD AUTO: 0.6 % (ref 0.3–6.2)
ERYTHROCYTE [DISTWIDTH] IN BLOOD BY AUTOMATED COUNT: 13.3 % (ref 12.3–15.4)
ETHANOL BLD-MCNC: <10 MG/DL (ref 0–10)
ETHANOL UR QL: <0.01 %
FENTANYL UR-MCNC: NEGATIVE NG/ML
GLOBULIN UR ELPH-MCNC: 3.1 GM/DL
GLUCOSE BLDC GLUCOMTR-MCNC: 124 MG/DL (ref 70–99)
GLUCOSE SERPL-MCNC: 113 MG/DL (ref 65–99)
HCT VFR BLD AUTO: 42.2 % (ref 34–46.6)
HGB BLD-MCNC: 13.7 G/DL (ref 12–15.9)
HOLD SPECIMEN: NORMAL
IMM GRANULOCYTES # BLD AUTO: 0.02 10*3/MM3 (ref 0–0.05)
IMM GRANULOCYTES NFR BLD AUTO: 0.2 % (ref 0–0.5)
LYMPHOCYTES # BLD AUTO: 5.22 10*3/MM3 (ref 0.7–3.1)
LYMPHOCYTES NFR BLD AUTO: 45 % (ref 19.6–45.3)
MCH RBC QN AUTO: 29.7 PG (ref 26.6–33)
MCHC RBC AUTO-ENTMCNC: 32.5 G/DL (ref 31.5–35.7)
MCV RBC AUTO: 91.5 FL (ref 79–97)
METHADONE UR QL SCN: NEGATIVE
MONOCYTES # BLD AUTO: 0.37 10*3/MM3 (ref 0.1–0.9)
MONOCYTES NFR BLD AUTO: 3.2 % (ref 5–12)
NEUTROPHILS NFR BLD AUTO: 5.84 10*3/MM3 (ref 1.7–7)
NEUTROPHILS NFR BLD AUTO: 50.2 % (ref 42.7–76)
NRBC BLD AUTO-RTO: 0 /100 WBC (ref 0–0.2)
OPIATES UR QL: NEGATIVE
OXYCODONE UR QL SCN: NEGATIVE
PLATELET # BLD AUTO: 411 10*3/MM3 (ref 140–450)
PMV BLD AUTO: 9 FL (ref 6–12)
POTASSIUM SERPL-SCNC: 3.4 MMOL/L (ref 3.5–5.2)
PROT SERPL-MCNC: 7 G/DL (ref 6–8.5)
RBC # BLD AUTO: 4.61 10*6/MM3 (ref 3.77–5.28)
SALICYLATES SERPL-MCNC: <0.3 MG/DL
SMALL PLATELETS BLD QL SMEAR: ADEQUATE
SODIUM SERPL-SCNC: 142 MMOL/L (ref 136–145)
SPHEROCYTES BLD QL SMEAR: NORMAL
WBC MORPH BLD: NORMAL
WBC NRBC COR # BLD AUTO: 11.61 10*3/MM3 (ref 3.4–10.8)
WHOLE BLOOD HOLD COAG: NORMAL
WHOLE BLOOD HOLD COAG: NORMAL
WHOLE BLOOD HOLD SPECIMEN: NORMAL
WHOLE BLOOD HOLD SPECIMEN: NORMAL

## 2024-05-03 PROCEDURE — 80307 DRUG TEST PRSMV CHEM ANLYZR: CPT | Performed by: EMERGENCY MEDICINE

## 2024-05-03 PROCEDURE — 80143 DRUG ASSAY ACETAMINOPHEN: CPT | Performed by: EMERGENCY MEDICINE

## 2024-05-03 PROCEDURE — 83735 ASSAY OF MAGNESIUM: CPT | Performed by: INTERNAL MEDICINE

## 2024-05-03 PROCEDURE — 99285 EMERGENCY DEPT VISIT HI MDM: CPT

## 2024-05-03 PROCEDURE — 85025 COMPLETE CBC W/AUTO DIFF WBC: CPT

## 2024-05-03 PROCEDURE — 93010 ELECTROCARDIOGRAM REPORT: CPT | Performed by: INTERNAL MEDICINE

## 2024-05-03 PROCEDURE — 85007 BL SMEAR W/DIFF WBC COUNT: CPT

## 2024-05-03 PROCEDURE — 82077 ASSAY SPEC XCP UR&BREATH IA: CPT | Performed by: EMERGENCY MEDICINE

## 2024-05-03 PROCEDURE — 80179 DRUG ASSAY SALICYLATE: CPT | Performed by: EMERGENCY MEDICINE

## 2024-05-03 PROCEDURE — 93005 ELECTROCARDIOGRAM TRACING: CPT

## 2024-05-03 PROCEDURE — 93005 ELECTROCARDIOGRAM TRACING: CPT | Performed by: EMERGENCY MEDICINE

## 2024-05-03 PROCEDURE — 80053 COMPREHEN METABOLIC PANEL: CPT | Performed by: EMERGENCY MEDICINE

## 2024-05-03 PROCEDURE — 82948 REAGENT STRIP/BLOOD GLUCOSE: CPT

## 2024-05-03 RX ORDER — ACETAMINOPHEN 325 MG/1
650 TABLET ORAL EVERY 4 HOURS PRN
Status: DISCONTINUED | OUTPATIENT
Start: 2024-05-03 | End: 2024-05-04 | Stop reason: SDUPTHER

## 2024-05-03 RX ORDER — LORAZEPAM 2 MG/ML
2 INJECTION INTRAMUSCULAR EVERY 4 HOURS PRN
Status: DISCONTINUED | OUTPATIENT
Start: 2024-05-03 | End: 2024-05-04 | Stop reason: HOSPADM

## 2024-05-03 RX ORDER — LOPERAMIDE HYDROCHLORIDE 2 MG/1
2 CAPSULE ORAL
Status: DISCONTINUED | OUTPATIENT
Start: 2024-05-03 | End: 2024-05-04 | Stop reason: HOSPADM

## 2024-05-03 RX ORDER — HALOPERIDOL 5 MG/1
5 TABLET ORAL EVERY 4 HOURS PRN
Status: DISCONTINUED | OUTPATIENT
Start: 2024-05-03 | End: 2024-05-04 | Stop reason: HOSPADM

## 2024-05-03 RX ORDER — DICLOFENAC SODIUM 75 MG/1
75 TABLET, DELAYED RELEASE ORAL 2 TIMES DAILY
COMMUNITY
Start: 2024-04-08 | End: 2024-05-09 | Stop reason: HOSPADM

## 2024-05-03 RX ORDER — DIPHENHYDRAMINE HYDROCHLORIDE 50 MG/ML
50 INJECTION INTRAMUSCULAR; INTRAVENOUS EVERY 6 HOURS PRN
Status: DISCONTINUED | OUTPATIENT
Start: 2024-05-03 | End: 2024-05-04 | Stop reason: HOSPADM

## 2024-05-03 RX ORDER — ALUMINA, MAGNESIA, AND SIMETHICONE 2400; 2400; 240 MG/30ML; MG/30ML; MG/30ML
15 SUSPENSION ORAL EVERY 6 HOURS PRN
Status: DISCONTINUED | OUTPATIENT
Start: 2024-05-03 | End: 2024-05-04 | Stop reason: HOSPADM

## 2024-05-03 RX ORDER — HYDROXYZINE PAMOATE 50 MG/1
50 CAPSULE ORAL EVERY 6 HOURS PRN
Status: DISCONTINUED | OUTPATIENT
Start: 2024-05-03 | End: 2024-05-04 | Stop reason: HOSPADM

## 2024-05-03 RX ORDER — FAMOTIDINE 20 MG/1
20 TABLET, FILM COATED ORAL 2 TIMES DAILY
COMMUNITY
Start: 2024-04-08

## 2024-05-03 RX ORDER — HALOPERIDOL 5 MG/ML
5 INJECTION INTRAMUSCULAR EVERY 4 HOURS PRN
Status: DISCONTINUED | OUTPATIENT
Start: 2024-05-03 | End: 2024-05-04 | Stop reason: HOSPADM

## 2024-05-03 RX ORDER — TRAZODONE HYDROCHLORIDE 100 MG/1
100 TABLET ORAL NIGHTLY PRN
Status: DISCONTINUED | OUTPATIENT
Start: 2024-05-03 | End: 2024-05-04

## 2024-05-03 RX ORDER — DIPHENHYDRAMINE HCL 50 MG
50 CAPSULE ORAL EVERY 4 HOURS PRN
Status: DISCONTINUED | OUTPATIENT
Start: 2024-05-03 | End: 2024-05-04 | Stop reason: HOSPADM

## 2024-05-03 RX ORDER — LORAZEPAM 2 MG/1
2 TABLET ORAL EVERY 4 HOURS PRN
Status: DISCONTINUED | OUTPATIENT
Start: 2024-05-03 | End: 2024-05-04

## 2024-05-03 RX ORDER — AMITRIPTYLINE HYDROCHLORIDE 25 MG/1
25 TABLET, FILM COATED ORAL NIGHTLY
COMMUNITY
Start: 2024-04-08 | End: 2024-05-04 | Stop reason: HOSPADM

## 2024-05-03 RX ORDER — NICOTINE 21 MG/24HR
1 PATCH, TRANSDERMAL 24 HOURS TRANSDERMAL DAILY PRN
Status: DISCONTINUED | OUTPATIENT
Start: 2024-05-03 | End: 2024-05-04 | Stop reason: HOSPADM

## 2024-05-03 RX ORDER — SODIUM CHLORIDE 0.9 % (FLUSH) 0.9 %
10 SYRINGE (ML) INJECTION AS NEEDED
Status: DISCONTINUED | OUTPATIENT
Start: 2024-05-03 | End: 2024-05-04 | Stop reason: HOSPADM

## 2024-05-04 ENCOUNTER — HOSPITAL ENCOUNTER (INPATIENT)
Facility: HOSPITAL | Age: 59
LOS: 5 days | Discharge: PSYCHIATRIC OR SUBSTANCE USE FACILITY | DRG: 881 | End: 2024-05-09
Attending: INTERNAL MEDICINE | Admitting: INTERNAL MEDICINE
Payer: COMMERCIAL

## 2024-05-04 ENCOUNTER — HOSPITAL ENCOUNTER (INPATIENT)
Facility: HOSPITAL | Age: 59
LOS: 1 days | Discharge: ANOTHER HOSPITAL | DRG: 881 | End: 2024-05-04
Attending: PSYCHIATRY & NEUROLOGY | Admitting: PSYCHIATRY & NEUROLOGY
Payer: COMMERCIAL

## 2024-05-04 VITALS
WEIGHT: 216.05 LBS | HEART RATE: 129 BPM | DIASTOLIC BLOOD PRESSURE: 114 MMHG | HEIGHT: 66 IN | OXYGEN SATURATION: 91 % | TEMPERATURE: 97.9 F | SYSTOLIC BLOOD PRESSURE: 144 MMHG | RESPIRATION RATE: 21 BRPM | BODY MASS INDEX: 34.72 KG/M2

## 2024-05-04 PROBLEM — E86.0 DEHYDRATION: Status: ACTIVE | Noted: 2024-05-04

## 2024-05-04 LAB — MAGNESIUM SERPL-MCNC: 1.8 MG/DL (ref 1.6–2.6)

## 2024-05-04 PROCEDURE — 99223 1ST HOSP IP/OBS HIGH 75: CPT | Performed by: INTERNAL MEDICINE

## 2024-05-04 PROCEDURE — 25810000003 SODIUM CHLORIDE 0.9 % SOLUTION: Performed by: INTERNAL MEDICINE

## 2024-05-04 PROCEDURE — 63710000001 DIPHENHYDRAMINE PER 50 MG: Performed by: PSYCHIATRY & NEUROLOGY

## 2024-05-04 RX ORDER — ACETAMINOPHEN 325 MG/1
650 TABLET ORAL EVERY 6 HOURS PRN
Status: DISCONTINUED | OUTPATIENT
Start: 2024-05-04 | End: 2024-05-04 | Stop reason: SDUPTHER

## 2024-05-04 RX ORDER — HYDRALAZINE HYDROCHLORIDE 20 MG/ML
10 INJECTION INTRAMUSCULAR; INTRAVENOUS EVERY 6 HOURS PRN
Status: DISCONTINUED | OUTPATIENT
Start: 2024-05-04 | End: 2024-05-09 | Stop reason: HOSPADM

## 2024-05-04 RX ORDER — ARIPIPRAZOLE 10 MG/1
10 TABLET ORAL DAILY
Status: DISCONTINUED | OUTPATIENT
Start: 2024-05-04 | End: 2024-05-04 | Stop reason: HOSPADM

## 2024-05-04 RX ORDER — LORAZEPAM 2 MG/ML
2 INJECTION INTRAMUSCULAR ONCE
Status: DISCONTINUED | OUTPATIENT
Start: 2024-05-04 | End: 2024-05-04 | Stop reason: HOSPADM

## 2024-05-04 RX ORDER — ARIPIPRAZOLE 10 MG/1
10 TABLET ORAL DAILY
Qty: 30 TABLET | Refills: 0 | Status: SHIPPED | OUTPATIENT
Start: 2024-05-05 | End: 2024-05-09 | Stop reason: HOSPADM

## 2024-05-04 RX ORDER — AMITRIPTYLINE HYDROCHLORIDE 25 MG/1
25 TABLET, FILM COATED ORAL NIGHTLY
Status: DISCONTINUED | OUTPATIENT
Start: 2024-05-04 | End: 2024-05-07

## 2024-05-04 RX ORDER — CHOLECALCIFEROL (VITAMIN D3) 125 MCG
5 CAPSULE ORAL NIGHTLY PRN
Status: DISCONTINUED | OUTPATIENT
Start: 2024-05-04 | End: 2024-05-09 | Stop reason: HOSPADM

## 2024-05-04 RX ORDER — GABAPENTIN 300 MG/1
300 CAPSULE ORAL 3 TIMES DAILY
Status: DISCONTINUED | OUTPATIENT
Start: 2024-05-04 | End: 2024-05-04 | Stop reason: HOSPADM

## 2024-05-04 RX ORDER — SODIUM CHLORIDE 0.9 % (FLUSH) 0.9 %
10 SYRINGE (ML) INJECTION AS NEEDED
Status: DISCONTINUED | OUTPATIENT
Start: 2024-05-04 | End: 2024-05-09 | Stop reason: HOSPADM

## 2024-05-04 RX ORDER — GABAPENTIN 300 MG/1
300 CAPSULE ORAL EVERY 12 HOURS SCHEDULED
Status: DISCONTINUED | OUTPATIENT
Start: 2024-05-04 | End: 2024-05-09 | Stop reason: HOSPADM

## 2024-05-04 RX ORDER — ONDANSETRON 2 MG/ML
4 INJECTION INTRAMUSCULAR; INTRAVENOUS EVERY 6 HOURS PRN
Status: DISCONTINUED | OUTPATIENT
Start: 2024-05-04 | End: 2024-05-09 | Stop reason: HOSPADM

## 2024-05-04 RX ORDER — FAMOTIDINE 20 MG/1
20 TABLET, FILM COATED ORAL 2 TIMES DAILY
Status: DISCONTINUED | OUTPATIENT
Start: 2024-05-04 | End: 2024-05-04 | Stop reason: HOSPADM

## 2024-05-04 RX ORDER — SODIUM CHLORIDE 9 MG/ML
40 INJECTION, SOLUTION INTRAVENOUS AS NEEDED
Status: DISCONTINUED | OUTPATIENT
Start: 2024-05-04 | End: 2024-05-09 | Stop reason: HOSPADM

## 2024-05-04 RX ORDER — AMITRIPTYLINE HYDROCHLORIDE 25 MG/1
25 TABLET, FILM COATED ORAL NIGHTLY
Status: DISCONTINUED | OUTPATIENT
Start: 2024-05-04 | End: 2024-05-04 | Stop reason: HOSPADM

## 2024-05-04 RX ORDER — SODIUM CHLORIDE 0.9 % (FLUSH) 0.9 %
10 SYRINGE (ML) INJECTION EVERY 12 HOURS SCHEDULED
Status: DISCONTINUED | OUTPATIENT
Start: 2024-05-04 | End: 2024-05-09 | Stop reason: HOSPADM

## 2024-05-04 RX ORDER — CARVEDILOL 6.25 MG/1
6.25 TABLET ORAL 2 TIMES DAILY WITH MEALS
Status: DISCONTINUED | OUTPATIENT
Start: 2024-05-04 | End: 2024-05-05

## 2024-05-04 RX ORDER — DIAZEPAM 5 MG/1
5 TABLET ORAL EVERY 8 HOURS PRN
Status: DISCONTINUED | OUTPATIENT
Start: 2024-05-04 | End: 2024-05-04

## 2024-05-04 RX ORDER — LORAZEPAM 2 MG/ML
1 INJECTION INTRAMUSCULAR EVERY 4 HOURS PRN
Status: DISCONTINUED | OUTPATIENT
Start: 2024-05-04 | End: 2024-05-06

## 2024-05-04 RX ORDER — POTASSIUM CHLORIDE 20 MEQ/1
40 TABLET, EXTENDED RELEASE ORAL DAILY
Status: DISCONTINUED | OUTPATIENT
Start: 2024-05-04 | End: 2024-05-04

## 2024-05-04 RX ORDER — NAPROXEN 250 MG/1
500 TABLET ORAL 2 TIMES DAILY WITH MEALS
Status: DISCONTINUED | OUTPATIENT
Start: 2024-05-04 | End: 2024-05-04 | Stop reason: HOSPADM

## 2024-05-04 RX ORDER — ACETAMINOPHEN 325 MG/1
650 TABLET ORAL EVERY 4 HOURS PRN
Status: DISCONTINUED | OUTPATIENT
Start: 2024-05-04 | End: 2024-05-09 | Stop reason: HOSPADM

## 2024-05-04 RX ORDER — ARIPIPRAZOLE 5 MG/1
10 TABLET ORAL DAILY
Status: DISCONTINUED | OUTPATIENT
Start: 2024-05-05 | End: 2024-05-06

## 2024-05-04 RX ORDER — CARVEDILOL 3.12 MG/1
6.25 TABLET ORAL 2 TIMES DAILY WITH MEALS
Status: DISCONTINUED | OUTPATIENT
Start: 2024-05-04 | End: 2024-05-04 | Stop reason: HOSPADM

## 2024-05-04 RX ADMIN — HALOPERIDOL 5 MG: 5 TABLET ORAL at 02:15

## 2024-05-04 RX ADMIN — LORAZEPAM 2 MG: 2 TABLET ORAL at 02:15

## 2024-05-04 RX ADMIN — SODIUM CHLORIDE 2000 ML: 9 INJECTION, SOLUTION INTRAVENOUS at 21:55

## 2024-05-04 RX ADMIN — Medication 10 ML: at 21:47

## 2024-05-04 RX ADMIN — DIPHENHYDRAMINE HYDROCHLORIDE 50 MG: 50 CAPSULE ORAL at 02:15

## 2024-05-04 NOTE — NURSING NOTE
"Voluntary admission from the e.d.,arrived on the unit at 0010 with diagnosis of Depression.Pt sat during the admission process and assessment with her head down and no eye contact.Pt made statement several times that she is ashamed for what she has done but does not elaborate.Pt is a poor historian and often responds with a answer \"yes or no\"then state \"I don't know\".States she lives in a apartment with her dog '\"Aziza sethi\" and wants someone to check on her...States the reason for admission is: \"A friend called the police or ambulance because I need help for drug use and for suicidal ideations\". States she cut her left wrist yesterday or maybe the day before with a knife. Noted several faint superficial self inflicted scratches to her left wrist area that requires no treatment.Stated \"I didn't go deep enough\". Reports  past suicide attempt by overdose  5 years ago.States  this is her first psych admit.Reports history of depression and anxiety.When ask if she is currently taking medications, stated she isn't then stated she takes some med's but wasn't able to state what medications she is taking.Pt made several statement that she doesn't want to take any medications or drugs while here but stated she wants help.  Pt denies homicidal ideations. Pt denies hallucinations, did state she isn't thinking straight right now.Pt stated while talking with nurse that she can hear them laughing and she deserves that.Pt ask to use the bathroom but then refused to go in the bathroom but refused to state why.  Pt reports using meth and crack ,states her last use was last night, when ask how often stated uses \"a lot and for a long time\".Tox screen positive for Benzo's and cocaine.Pt stated she is unsure if she had substance abuse treatment in the past.  Pt reports recent fall,noted a bruise to her right cheek on face,right knee with open abrasion,area cleansed with normal saline and band aide applied.  Pt placed on close watch for " high risk for suicide, placed on fall precautions.treatment plans initiated,will provide a safe environment..

## 2024-05-04 NOTE — PLAN OF CARE
Goal Outcome Evaluation:  Plan of Care Reviewed With: patient  Patient Agreement with Plan of Care: agrees with comment (describe) (Pt refused meds and did not participate in group or assessment.)                Pt is alert to self and able to make needs known.  Pt refused morning medicine and was unable to verbally participate in nursing assessment.  Pt has refused all food and drink today.  Pt has been withdrawn, sitting on her bed today.  Pt weeps when addressed by staff.  Pt maintains no eye contact. No s/s of acute distress observed at this time.

## 2024-05-04 NOTE — ED PROVIDER NOTES
"Time: 10:00 PM EDT  Date of encounter:  5/3/2024  Independent Historian/Clinical History and Information was obtained by:   Patient    History is limited by: N/A    Chief Complaint: SI      History of Present Illness:  Patient is a 58 y.o. year old female who presents to the emergency department for evaluation of suicidal ideation.  Patient reports she is a meth user and her family and friends been making fun of her on the Internet for using drugs.  Patient reports she has lost all her family due to her drug use.  Patient denies any visual or auditory hallucinations, sleep disturbance, suicidal attempts today.  Patient reports she has attempted suicide in the past but taking an overdose of Xanax.  Patient reports she \"just does not want to be here anymore \".     HPI    Patient Care Team  Primary Care Provider: Taina Cheema APRN    Past Medical History:     No Known Allergies  Past Medical History:   Diagnosis Date    Anxiety and depression     Back pain      Past Surgical History:   Procedure Laterality Date    HYSTERECTOMY      SPINAL FUSION       History reviewed. No pertinent family history.    Home Medications:  Prior to Admission medications    Medication Sig Start Date End Date Taking? Authorizing Provider   albuterol sulfate  (90 Base) MCG/ACT inhaler Inhale 2 puffs Every 4 (Four) Hours As Needed for Wheezing. 9/23/23 4/6/26  [employer],    ALPRAZolam (Xanax) 0.25 MG tablet Xanax 0.25 mg oral tablet take 1 tablet (0.25 mg) by oral route 3 times per day   Active    Emergency, Nurse Kendrick, RN   carvedilol (Coreg) 6.25 MG tablet Take 1 tablet by mouth 2 (Two) Times a Day With Meals. 2/7/24   YULIET Goodwin MD   diazePAM (VALIUM) 5 MG tablet  1/8/24   Provider, MD Clay   Diclofenac Sodium (VOLTAREN) 1 % gel gel Apply 4 g topically to the appropriate area as directed 4 (Four) Times a Day As Needed (Moderate pain). 1/11/24   Madisyn Ortega APRN   gabapentin (NEURONTIN) 300 MG capsule  " "1/8/24   Provider, MD Clay   Omega-3 Fatty Acids (fish oil) 1000 MG capsule capsule Take  by mouth.    Emergency, Nurse Kendrick RN   tiZANidine (ZANAFLEX) 4 MG tablet Every 8 (Eight) Hours.    ProviderClay MD   venlafaxine XR (EFFEXOR-XR) 150 MG 24 hr capsule venlafaxine 150 mg oral capsule,extended release 24hr take 1 capsule (150 mg) by oral route once daily   Active    Emergency, Nurse Kendrick RN        Social History:   Social History     Tobacco Use    Smoking status: Never    Smokeless tobacco: Never   Vaping Use    Vaping status: Never Used   Substance Use Topics    Alcohol use: Not Currently    Drug use: Never         Review of Systems:  Review of Systems   Constitutional:  Negative for chills, fatigue and fever.   HENT:  Negative for ear pain, rhinorrhea and sore throat.    Eyes:  Negative for visual disturbance.   Respiratory:  Negative for cough and shortness of breath.    Cardiovascular:  Negative for chest pain.   Gastrointestinal:  Negative for abdominal pain, diarrhea and vomiting.   Genitourinary:  Negative for difficulty urinating.   Musculoskeletal:  Negative for arthralgias, back pain and myalgias.   Skin:  Negative for rash.   Neurological:  Negative for light-headedness and headaches.   Hematological:  Negative for adenopathy.   Psychiatric/Behavioral:  Positive for suicidal ideas.         Physical Exam:  BP (!) 164/111 (BP Location: Right arm, Patient Position: Sitting)   Pulse 105   Temp 98 °F (36.7 °C) (Oral)   Resp 18   Ht 167.6 cm (66\")   Wt 98 kg (216 lb 0.8 oz)   SpO2 95%   BMI 34.87 kg/m²     Physical Exam  Vitals and nursing note reviewed.   Constitutional:       General: She is not in acute distress.     Appearance: Normal appearance. She is not toxic-appearing.   HENT:      Head: Normocephalic and atraumatic.      Nose: Nose normal.      Mouth/Throat:      Mouth: Mucous membranes are moist.   Eyes:      Conjunctiva/sclera: Conjunctivae normal.   Cardiovascular:      " Rate and Rhythm: Normal rate.      Pulses: Normal pulses.      Heart sounds: Normal heart sounds.   Pulmonary:      Effort: Pulmonary effort is normal.      Breath sounds: Normal breath sounds.   Abdominal:      General: Bowel sounds are normal.      Palpations: Abdomen is soft.      Tenderness: There is no abdominal tenderness.   Musculoskeletal:         General: Normal range of motion.      Cervical back: Normal range of motion.   Skin:     General: Skin is warm and dry.   Neurological:      General: No focal deficit present.      Mental Status: She is alert and oriented to person, place, and time.   Psychiatric:      Comments: Patient is tearful at time of exam.                   Procedures:  Procedures      Medical Decision Making:      Comorbidities that affect care:    None    External Notes reviewed:    None      The following orders were placed and all results were independently analyzed by me:  Orders Placed This Encounter   Procedures    [place] Draw    Comprehensive Metabolic Panel    Acetaminophen Level    Ethanol    Salicylate Level    Urine Drug Screen - Urine, Clean Catch    CBC Auto Differential    Scan Slide    NPO Diet NPO Type: Strict NPO    Continuous Pulse Oximetry    Vital Signs    Undress & Gown    Psych / Access to See    Inpatient Psychiatrist Consult    Oxygen Therapy- Nasal Cannula; Titrate 1-6 LPM Per SpO2; 90 - 95%    POC Glucose Once    POC Glucose Once    ECG 12 Lead Drug Monitoring    Insert Peripheral IV    Suicide Precautions    CBC & Differential    Green Top (Gel)    Lavender Top    Gold Top - SST    Light Blue Top    Extra Tubes    Gold Top - SST    Extra Tubes    Lavender Top    Extra Tubes    Light Blue Top       Medications Given in the Emergency Department:  Medications   sodium chloride 0.9 % flush 10 mL (has no administration in time range)        ED Course:    ED Course as of 05/03/24 2255   Fri May 03, 2024   2252 Dr. Joe excepted patient for admission. [CE]       ED Course User Index  [CE] Stephanie Quezada APRN       Labs:    Lab Results (last 24 hours)       Procedure Component Value Units Date/Time    CBC & Differential [[id number]]  (Abnormal) Collected: 05/03/24 1709    Specimen: Blood Updated: 05/03/24 1749    Narrative:      The following orders were created for panel order CBC & Differential.  Procedure                               Abnormality         Status                     ---------                               -----------         ------                     CBC Auto Differential[[id number]]        Abnormal            Final result               Scan Slide[[id number]]                                       Final result                 Please view results for these tests on the individual orders.    CBC Auto Differential [[id number]]  (Abnormal) Collected: 05/03/24 1709    Specimen: Blood Updated: 05/03/24 1749     WBC 11.61 10*3/mm3      RBC 4.61 10*6/mm3      Hemoglobin 13.7 g/dL      Hematocrit 42.2 %      MCV 91.5 fL      MCH 29.7 pg      MCHC 32.5 g/dL      RDW 13.3 %      RDW-SD 42.9 fl      MPV 9.0 fL      Platelets 411 10*3/mm3      Neutrophil % 50.2 %      Lymphocyte % 45.0 %      Monocyte % 3.2 %      Eosinophil % 0.6 %      Basophil % 0.8 %      Immature Grans % 0.2 %      Neutrophils, Absolute 5.84 10*3/mm3      Lymphocytes, Absolute 5.22 10*3/mm3      Monocytes, Absolute 0.37 10*3/mm3      Eosinophils, Absolute 0.07 10*3/mm3      Basophils, Absolute 0.09 10*3/mm3      Immature Grans, Absolute 0.02 10*3/mm3      nRBC 0.0 /100 WBC     Scan Slide [[id number]] Collected: 05/03/24 1709    Specimen: Blood Updated: 05/03/24 1749     Spherocytes Slight/1+     WBC Morphology Normal     Platelet Estimate Adequate     Clumped Platelets Present    POC Glucose Once [[id number]]  (Abnormal) Collected: 05/03/24 1723    Specimen: Blood Updated: 05/03/24 1725     Glucose 124 mg/dL      Comment: Serial Number: [id number]:  [id number]       Comprehensive Metabolic  Panel [[id number]]  (Abnormal) Collected: 05/03/24 1748    Specimen: Blood Updated: 05/03/24 1838     Glucose 113 mg/dL      BUN 16 mg/dL      Creatinine 0.79 mg/dL      Sodium 142 mmol/L      Potassium 3.4 mmol/L      Chloride 105 mmol/L      CO2 22.9 mmol/L      Calcium 8.6 mg/dL      Total Protein 7.0 g/dL      Albumin 3.9 g/dL      ALT (SGPT) 12 U/L      AST (SGOT) 15 U/L      Alkaline Phosphatase 135 U/L      Total Bilirubin 0.3 mg/dL      Globulin 3.1 gm/dL      A/G Ratio 1.3 g/dL      BUN/Creatinine Ratio 20.3     Anion Gap 14.1 mmol/L      eGFR 86.8 mL/min/1.73     Narrative:      GFR Normal >60  Chronic Kidney Disease <60  Kidney Failure <15      Acetaminophen Level [[id number]]  (Normal) Collected: 05/03/24 1748    Specimen: Blood Updated: 05/03/24 1836     Acetaminophen <5.0 mcg/mL     Ethanol [[id number]] Collected: 05/03/24 1748    Specimen: Blood Updated: 05/03/24 1836     Ethanol <10 mg/dL      Ethanol % <0.010 %     Narrative:      Ethanol (Plasma)  <10 Essentially Negative    Toxic Concentrations           mg/dL    Flushing, slowing of reflexes    Impaired visual activity         Depression of CNS              >100  Possible Coma                  >300       Salicylate Level [[id number]]  (Normal) Collected: 05/03/24 1748    Specimen: Blood Updated: 05/03/24 1836     Salicylate <0.3 mg/dL     Urine Drug Screen - Urine, Clean Catch [[id number]]  (Abnormal) Collected: 05/03/24 2024    Specimen: Urine, Clean Catch Updated: 05/03/24 2059     Amphet/Methamphet, Screen Negative     Barbiturates Screen, Urine Negative     Benzodiazepine Screen, Urine Positive     Cocaine Screen, Urine Positive     Opiate Screen Negative     THC, Screen, Urine Negative     Methadone Screen, Urine Negative     Oxycodone Screen, Urine Negative     Fentanyl, Urine Negative    Narrative:      Negative Thresholds Per Drugs Screened:    Amphetamines                 500 ng/ml  Barbiturates                 200  ng/ml  Benzodiazepines              100 ng/ml  Cocaine                      300 ng/ml  Methadone                    300 ng/ml  Opiates                      300 ng/ml  Oxycodone                    100 ng/ml  THC                           50 ng/ml  Fentanyl                       5 ng/ml      The Normal Value for all drugs tested is negative. This report includes final unconfirmed screening results to be used for medical treatment purposes only. Unconfirmed results must not be used for non-medical purposes such as employment or legal testing. Clinical consideration should be applied to any drug of abuse test, particularly when unconfirmed results are used.                     Imaging:    No Radiology Exams Resulted Within Past 24 Hours      Differential Diagnosis and Discussion:    Psychiatric: Differential diagnosis includes but is not limited to depression, psychosis, bipolar disorder, anxiety, manic episode, schizophrenia, and substance abuse.    All labs were reviewed and interpreted by me.    [facility]           Patient Care Considerations:    PSYCH: I considered ordering anxiolytic and or antipsychotic medications, however patient was able to facilitate the medical screening exam and disposition without further medications.      Consultants/Shared Management Plan:    None    Social Determinants of Health:    Patient admitted to psychiatric bed.      Disposition and Care Coordination:    Admit:   Through independent evaluation of the patient's history, physical, and imperical data, the patient meets criteria for inpatient admission to the hospital.        Final diagnoses:   Suicidal ideation        ED Disposition       ED Disposition   Decision to Admit    Condition   --    Comment   --               This medical record created using voice recognition software.             Stephanie Quezada P, APRN  05/03/24 [id number]

## 2024-05-04 NOTE — H&P
[facility]   HOSPITALIST HISTORY AND PHYSICAL  Date: 2024   Patient Name: Anjali Medina  : 1965  MRN: [id number]  Primary Care Physician:  Taina Cheema APRN  Date of admission: 2024    Subjective   Subjective     Chief Complaint: High blood pressure and heart rate    HPI:    Anjali Medina is a 58 y.o. female with hypertension, chronic benzodiazepine use, history of cocaine/methamphetamine use, depression who was admitted to psychiatric unit in setting of worsening depression, suicidal ideations and recent self-harm by cutting her wrist.  Per reports she had endorsed auditory hallucinations. UDS was positive for benzodiazepines and cocaine.  She is prescribed Valium 5 mg every 8 hours as needed. Ethanol, Tylenol and aspirin level negative.  White count 11.6 otherwise unremarkable.  Potassium 3.4 otherwise CMP unremarkable. Per psychiatry , plan was to wean her off benzodiazepines slowly.  She was restarted on home amitriptyline and Abilify was added.     RRT was called today as patient became tachycardic in the 180s. Blood pressures ranging 110 over 90s to 170s over 130 since admission.  Telemetry showed sinus tachycardia.  She has been refusing to eat or drink and take similar medication. she looked dehydrated.  She was not agitated, showing signs of hyperactive delirium or seizures.  She would barely talk to me and would not allow me to examine her.    Personal History     Obtained from chart review    Past Medical History:  Hypertension  Anxiety  Depression    Past Surgical History:  Past Surgical History:   Procedure Laterality Date   • HYSTERECTOMY     • SPINAL FUSION       Family History:   History reviewed. No pertinent family history.      Social History:   No tobacco use  Denied recent alcohol use  Active benzodiazepine use  History of crack cocaine/amphetamine use    Home Medications:  amitriptyline, carvedilol, diazePAM, diclofenac, famotidine, fish oil, gabapentin,  tiZANidine, and venlafaxine XR    Allergies:  No Known Allergies    Review of Systems      Objective   Objective     Vitals:   Temp:  [97.9 °F (36.6 °C)-98.6 °F (37 °C)] 97.9 °F (36.6 °C)  Heart Rate:  [100-118] 117  Resp:  [16-22] 22  BP: (137-170)/() 146/112    Physical Exam    Constitutional: Well-nourished female, awake, sitting on side of the bed, avoiding eye contact, looks fatigued   HENT: NCAT, nares patent   Respiratory: Nonlabored.  Maintaining airway   Cardiovascular: Trace pedal edema bilaterally   Gastrointestinal: Nondistended   Neurologic: Alert, moving all 4 extremities spontaneously,    Result Review    Result Review:  I have personally reviewed the results from the time of this admission to 5/4/2024 18:08 EDT and agree with these findings:  [x]  Laboratory  CBC          9/23/2023    17:51 5/3/2024    17:09   CBC   WBC 9.72  11.61    RBC 3.91  4.61    Hemoglobin 11.1  13.7    Hematocrit 34.8  42.2    MCV 89.0  91.5    MCH 28.4  29.7    MCHC 31.9  32.5    RDW 13.9  13.3    Platelets 201  411      BMP          9/23/2023    17:51 5/3/2024    17:48   BMP   BUN 11  16    Creatinine 0.76  0.79    Sodium 140  142    Potassium 3.6  3.4    Chloride 105  105    CO2 23.1  22.9    Calcium 9.0  8.6        []  Microbiology  []  Radiology  [x]  EKG/Telemetry telemetry showing sinus tachycardia  EKG sinus tachycardia.  Initial QTc 478.  []  Cardiology/Vascular   []  Pathology  []  Old records  []  Other:      Assessment & Plan   Assessment / Plan     Assessment/Plan:   Dehydration  Sinus tachycardia  Uncontrolled hypertension  At risk for benzodiazepine withdrawal  Benzodiazepine abuse  Stimulant abuse   Unspecified neuropathy on gabapentin  Recurrent MDD with suicide ideation  Psychotic disorder, unspecified        58-year-old female with major depression, benzodiazepine abuse who was admitted for suicidal ideations. She reported self harm and auditory hallucination raising concern for psychosis. She has not  been manic but rather very withdrawn and has been refusing to eat or drink or take medications.  She now has unstable vitals with elevated blood pressure and heart rate and appears dehydrated.  She is at risk for benzodiazepine withdrawal.  I do feel that without more aggressive medical care she will decompensate.    Patient will be transferred to medicine floor, remote telemetry  Place peripheral IV  2 L NS over 2 hour  Blood pressure labile.  Did take dose of Coreg this morning.  Will continue. If refuses pills, will add IV hydralazine as needed for blood pressure greater than 180  Monitor CIWA score every shift  IV Ativan 1 mg every 4 hours as needed for agitation  Continue gabapentin 300 mg q12 hours  Psychiatry to follow. Discussed with Dr Joe  Continue Abilify 10 mg daily.    Continue amitriptyline 25 mg nightly  Continue one-to-one sitter      CODE STATUS:    Code Status (Patient has no pulse and is not breathing): CPR (Attempt to Resuscitate)  Medical Interventions (Patient has pulse or is breathing): Full Support      Admission Status:  I believe this patient meets INPATIENT status.    Electronically signed by Nathan Talavera DO, 05/04/24, 6:08 PM EDT.

## 2024-05-04 NOTE — NURSING NOTE
Pt's pants and brief were changed r/t diaphoresis. Joy care completed with assist x 1  Pt has not urinated today.

## 2024-05-04 NOTE — DISCHARGE SUMMARY
"               [facility]         DISCHARGE SUMMARY    Patient Name: Anjali Medina  : 1965  MRN: [id number]    Date of Admission: 2024  Date of Discharge:  2024  Primary Care Physician: Taina Cheema APRN    Consults       Date and Time Order Name Status Description    2024  6:03 PM Inpatient Hospitalist Consult              Presenting Problem:   Psychotic disorder unspecified  Major depressive d/o ,recurrent with psychotic features  Active and Resolved Hospital Problems:  Active Hospital Problems    Diagnosis POA   • **Depression [F32.A] Yes      Resolved Hospital Problems   No resolved problems to display.         Hospital Course     Hospital Course:  Anjali Medina is a 58 y.o. female with previous ds of depression, benzodiazepine use d/o presented with suicidal ideation with plan to cut her wrist; the patient was minimally cooperative with assessment, wanted to get help but refused to take medications,including valium and blood pressure medication; she had difficulties of expressing herself; the patient admitted hearing voices and feeling unsafe, wasn't able to contract for safety. The close watch notified the nurse that pt started sweating profusely,Vitals: Pl 170, /94; the patient refused fluid intake; the patient was incapable to understand or make a rational decision about her health issues; the patient needed to be transferred to the medical service for safety.        On day of discharge patient is unstable, and has no acute agitation or restlessness.  Speech is limited and language is incoherent.  Mood is described as \"not good \"and affect is flat.  Thought processes are irrational, and thought content is positive for suicidal ideation,no specific plan,paranoia, auditory hallucinations.  Insight is poor, and judgment is poor.      DISCHARGE Follow Up Recommendations for labs and diagnostics: n/a      Day of Discharge     Vital Signs:  Temp:  [97.9 °F (36.6 " °C)-98.6 °F (37 °C)] 97.9 °F (36.6 °C)  Heart Rate:  [100-118] 117  Resp:  [16-22] 22  BP: (137-170)/() 146/112      Pertinent  and/or Most Recent Results     LAB RESULTS:      Lab 05/03/24  1709   WBC 11.61*   HEMOGLOBIN 13.7   HEMATOCRIT 42.2   PLATELETS 411   NEUTROS ABS 5.84   IMMATURE GRANS (ABS) 0.02   LYMPHS ABS 5.22*   MONOS ABS 0.37   EOS ABS 0.07   MCV 91.5         Lab 05/03/24  1748   SODIUM 142   POTASSIUM 3.4*   CHLORIDE 105   CO2 22.9   ANION GAP 14.1   BUN 16   CREATININE 0.79   EGFR 86.8   GLUCOSE 113*   CALCIUM 8.6         Lab 05/03/24  1748   TOTAL PROTEIN 7.0   ALBUMIN 3.9   GLOBULIN 3.1   ALT (SGPT) 12   AST (SGOT) 15   BILIRUBIN 0.3   ALK PHOS 135*                                     Lab 05/03/24 1748   ETHANOL PCT <0.010   ETHANOL MGDL <10         Lab 05/03/24 2024   AMPH/METHAM SCREEN, URINE Negative   BENZODIAZEPINE SCREEN, URINE Positive*   COCAINE SCREEN, URINE Positive*   OPIATES Negative   THC URINE SCREEN Negative   METHADONE SCREEN, URINE Negative     Brief Urine Lab Results       None                                Imaging Results (Last 7 Days)       ** No results found for the last 168 hours. **             Labs Pending at Discharge:           Discharge Details        Discharge Medications        New Medications        Instructions Start Date   ARIPiprazole 10 MG tablet  Commonly known as: ABILIFY   10 mg, Oral, Daily   Start Date: May 5, 2024            Continue These Medications        Instructions Start Date   carvedilol 6.25 MG tablet  Commonly known as: Coreg   6.25 mg, Oral, 2 Times Daily With Meals      diclofenac 75 MG EC tablet  Commonly known as: VOLTAREN   75 mg, Oral, 2 Times Daily      famotidine 20 MG tablet  Commonly known as: PEPCID   20 mg, Oral, 2 Times Daily      gabapentin 300 MG capsule  Commonly known as: NEURONTIN   Take 1 capsule by mouth 3 (Three) Times a Day.             Stop These Medications      amitriptyline 25 MG tablet  Commonly known as:  ELAVIL     diazePAM 5 MG tablet  Commonly known as: VALIUM     fish oil 1000 MG capsule capsule     tiZANidine 4 MG tablet  Commonly known as: ZANAFLEX     venlafaxine  MG 24 hr capsule  Commonly known as: EFFEXOR-XR              No Known Allergies      Discharge Disposition:  Short Term Hospital (DC - External)    Diet:  Hospital:  Diet Order   Procedures   • Diet: Regular/House; Fluid Consistency: Thin (IDDSI 0)         Discharge Activity: Ad clarissa.      Discharge Condition: Serious    CODE STATUS:  Code Status and Medical Interventions:   Ordered at: 05/03/24 2328     Code Status (Patient has no pulse and is not breathing):    CPR (Attempt to Resuscitate)     Medical Interventions (Patient has pulse or is breathing):    Full Support         Future Appointments   Date Time Provider Department Center   5/20/2024  2:45 PM [facility] MRI 1 [facility] ETWMR [facility]   5/28/2024 10:30 AM Bradley Adler MD [facility] NS ETOWN [facility]   8/21/2024 11:15 AM Stephanie Fletcher APRN [facility] MARI ETWN [facility]           Time spent on Discharge including face to face service:  25 minutes    Part of this note may be an electronic transcription/translation of spoken language to printed text using the Dragon dictation system.        Electronically signed by Kacy Joe MD, 05/04/24, 6:10 PM EDT.
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
## 2024-05-04 NOTE — NURSING NOTE
"Pt in her room crying and holding her hands over her face,pt refusing to process her feeling with staff. Pt stating what sounded like \"get away from me\".patient repeatedly stated \"I'm sorry\" and that she is scared. Pt is visibly in distress,no eye contact,tearful and has limited engagement with staff.Pt given Haldol 5 mg, Ativan 2 mg and Benadryl 50 mg p.o for agitation. Will continue to monitor and provide a safe environment.   "

## 2024-05-05 ENCOUNTER — APPOINTMENT (OUTPATIENT)
Dept: GENERAL RADIOLOGY | Facility: HOSPITAL | Age: 59
DRG: 881 | End: 2024-05-05
Payer: COMMERCIAL

## 2024-05-05 LAB
ANION GAP SERPL CALCULATED.3IONS-SCNC: 17.2 MMOL/L (ref 5–15)
BACTERIA UR QL AUTO: ABNORMAL /HPF
BILIRUB UR QL STRIP: NEGATIVE
BUN SERPL-MCNC: 28 MG/DL (ref 6–20)
BUN/CREAT SERPL: 23.3 (ref 7–25)
CALCIUM SPEC-SCNC: 9.1 MG/DL (ref 8.6–10.5)
CHLORIDE SERPL-SCNC: 110 MMOL/L (ref 98–107)
CLARITY UR: ABNORMAL
CO2 SERPL-SCNC: 19.8 MMOL/L (ref 22–29)
COLOR UR: YELLOW
CREAT SERPL-MCNC: 1.2 MG/DL (ref 0.57–1)
DEPRECATED RDW RBC AUTO: 49.2 FL (ref 37–54)
EGFRCR SERPLBLD CKD-EPI 2021: 52.6 ML/MIN/1.73
ERYTHROCYTE [DISTWIDTH] IN BLOOD BY AUTOMATED COUNT: 13.8 % (ref 12.3–15.4)
GLUCOSE SERPL-MCNC: 125 MG/DL (ref 65–99)
GLUCOSE UR STRIP-MCNC: NEGATIVE MG/DL
HCT VFR BLD AUTO: 43.9 % (ref 34–46.6)
HGB BLD-MCNC: 13.2 G/DL (ref 12–15.9)
HGB UR QL STRIP.AUTO: ABNORMAL
HYALINE CASTS UR QL AUTO: ABNORMAL /LPF
KETONES UR QL STRIP: ABNORMAL
LEUKOCYTE ESTERASE UR QL STRIP.AUTO: ABNORMAL
MAGNESIUM SERPL-MCNC: 2.2 MG/DL (ref 1.6–2.6)
MCH RBC QN AUTO: 29.6 PG (ref 26.6–33)
MCHC RBC AUTO-ENTMCNC: 30.1 G/DL (ref 31.5–35.7)
MCV RBC AUTO: 98.4 FL (ref 79–97)
NITRITE UR QL STRIP: NEGATIVE
PH UR STRIP.AUTO: 6.5 [PH] (ref 5–8)
PLATELET # BLD AUTO: 348 10*3/MM3 (ref 140–450)
PMV BLD AUTO: 9 FL (ref 6–12)
POTASSIUM SERPL-SCNC: 3.4 MMOL/L (ref 3.5–5.2)
PROCALCITONIN SERPL-MCNC: 0.15 NG/ML (ref 0–0.25)
PROT UR QL STRIP: ABNORMAL
QT INTERVAL: 347 MS
QTC INTERVAL: 478 MS
RBC # BLD AUTO: 4.46 10*6/MM3 (ref 3.77–5.28)
RBC # UR STRIP: ABNORMAL /HPF
REF LAB TEST METHOD: ABNORMAL
SODIUM SERPL-SCNC: 147 MMOL/L (ref 136–145)
SP GR UR STRIP: 1.01 (ref 1–1.03)
SQUAMOUS #/AREA URNS HPF: ABNORMAL /HPF
UROBILINOGEN UR QL STRIP: ABNORMAL
WBC # UR STRIP: ABNORMAL /HPF
WBC NRBC COR # BLD AUTO: 15.24 10*3/MM3 (ref 3.4–10.8)

## 2024-05-05 PROCEDURE — 84145 PROCALCITONIN (PCT): CPT | Performed by: INTERNAL MEDICINE

## 2024-05-05 PROCEDURE — 87186 SC STD MICRODIL/AGAR DIL: CPT | Performed by: INTERNAL MEDICINE

## 2024-05-05 PROCEDURE — 25810000003 LACTATED RINGERS PER 1000 ML: Performed by: INTERNAL MEDICINE

## 2024-05-05 PROCEDURE — 87086 URINE CULTURE/COLONY COUNT: CPT | Performed by: INTERNAL MEDICINE

## 2024-05-05 PROCEDURE — 71045 X-RAY EXAM CHEST 1 VIEW: CPT

## 2024-05-05 PROCEDURE — 81001 URINALYSIS AUTO W/SCOPE: CPT | Performed by: INTERNAL MEDICINE

## 2024-05-05 PROCEDURE — 85027 COMPLETE CBC AUTOMATED: CPT | Performed by: INTERNAL MEDICINE

## 2024-05-05 PROCEDURE — 99233 SBSQ HOSP IP/OBS HIGH 50: CPT | Performed by: INTERNAL MEDICINE

## 2024-05-05 PROCEDURE — 25010000002 SODIUM CHLORIDE 0.9 % WITH KCL 20 MEQ 20-0.9 MEQ/L-% SOLUTION: Performed by: INTERNAL MEDICINE

## 2024-05-05 PROCEDURE — 83735 ASSAY OF MAGNESIUM: CPT | Performed by: INTERNAL MEDICINE

## 2024-05-05 PROCEDURE — 25010000002 CEFTRIAXONE PER 250 MG: Performed by: INTERNAL MEDICINE

## 2024-05-05 PROCEDURE — 80048 BASIC METABOLIC PNL TOTAL CA: CPT | Performed by: INTERNAL MEDICINE

## 2024-05-05 PROCEDURE — 25010000002 HYDRALAZINE PER 20 MG: Performed by: INTERNAL MEDICINE

## 2024-05-05 RX ORDER — SODIUM CHLORIDE AND POTASSIUM CHLORIDE 150; 900 MG/100ML; MG/100ML
100 INJECTION, SOLUTION INTRAVENOUS CONTINUOUS
Status: DISCONTINUED | OUTPATIENT
Start: 2024-05-05 | End: 2024-05-09 | Stop reason: HOSPADM

## 2024-05-05 RX ORDER — SODIUM CHLORIDE, SODIUM LACTATE, POTASSIUM CHLORIDE, CALCIUM CHLORIDE 600; 310; 30; 20 MG/100ML; MG/100ML; MG/100ML; MG/100ML
100 INJECTION, SOLUTION INTRAVENOUS CONTINUOUS
Status: DISCONTINUED | OUTPATIENT
Start: 2024-05-05 | End: 2024-05-05

## 2024-05-05 RX ORDER — POTASSIUM CHLORIDE 750 MG/1
40 CAPSULE, EXTENDED RELEASE ORAL ONCE
Status: DISCONTINUED | OUTPATIENT
Start: 2024-05-05 | End: 2024-05-05

## 2024-05-05 RX ORDER — CARVEDILOL 12.5 MG/1
12.5 TABLET ORAL 2 TIMES DAILY WITH MEALS
Status: DISCONTINUED | OUTPATIENT
Start: 2024-05-05 | End: 2024-05-09 | Stop reason: HOSPADM

## 2024-05-05 RX ADMIN — CARVEDILOL 6.25 MG: 6.25 TABLET, FILM COATED ORAL at 09:37

## 2024-05-05 RX ADMIN — ARIPIPRAZOLE 10 MG: 5 TABLET ORAL at 09:41

## 2024-05-05 RX ADMIN — Medication 10 ML: at 09:38

## 2024-05-05 RX ADMIN — POTASSIUM CHLORIDE AND SODIUM CHLORIDE 100 ML/HR: 900; 150 INJECTION, SOLUTION INTRAVENOUS at 15:41

## 2024-05-05 RX ADMIN — CEFTRIAXONE SODIUM 1000 MG: 1 INJECTION, POWDER, FOR SOLUTION INTRAMUSCULAR; INTRAVENOUS at 13:50

## 2024-05-05 RX ADMIN — SODIUM CHLORIDE, POTASSIUM CHLORIDE, SODIUM LACTATE AND CALCIUM CHLORIDE 100 ML/HR: 600; 310; 30; 20 INJECTION, SOLUTION INTRAVENOUS at 09:42

## 2024-05-05 RX ADMIN — HYDRALAZINE HYDROCHLORIDE 10 MG: 20 INJECTION, SOLUTION INTRAMUSCULAR; INTRAVENOUS at 13:50

## 2024-05-05 RX ADMIN — AMITRIPTYLINE HYDROCHLORIDE 25 MG: 25 TABLET, FILM COATED ORAL at 20:15

## 2024-05-05 RX ADMIN — CARVEDILOL 12.5 MG: 12.5 TABLET, FILM COATED ORAL at 20:17

## 2024-05-05 NOTE — NURSING NOTE
Patient arrived to the unit via wheelchair, accompanied by security, close watch, MHT with belongings. Vital signs taken and recorded. Patient initially refused all treatment. With some encouragement, patient accepted a cup of water, IV insertion, and fluid bolus. Attempted to take PO meds, but ultimately did not swallow them. Patient attempted to use restroom several times, unsuccessful so far.     Close watch in place, continues to monitor patient and encourage care.

## 2024-05-05 NOTE — NURSING NOTE
I received report from Kassidy DU to transfer care to me for the duration of shift. I will chart from 1300 on. Care ongoing.

## 2024-05-05 NOTE — PROGRESS NOTES
[facility]   Hospitalist Progress Note  Date: 2024  Patient Name: Anjali Medina  : 1965  MRN: [id number]  Date of admission: 2024      Subjective   Subjective     Chief Complaint: Follow up for high blood pressure and heart rate    Summary: 58 y.o. female with hypertension, chronic benzodiazepine use, history of cocaine/methamphetamine use, depression who was admitted to psychiatric unit in setting of worsening depression, suicidal ideations and recent self-harm by cutting her wrist.  Per reports she had endorsed auditory hallucinations. UDS was positive for benzodiazepines and cocaine.  She is prescribed Valium 5 mg every 8 hours as needed. Ethanol, Tylenol and aspirin level negative.  White count 11.6 otherwise unremarkable.  Potassium 3.4 otherwise CMP unremarkable. Per psychiatry , plan was to wean her off benzodiazepines slowly.  She was restarted on home amitriptyline and Abilify was added.  Had RRT called due to tachycardia and hypertension.  Had been refusing to eat and drink and was dehydrated.  Transfer to medical floor for intervention    Interval Followup:   Was agreeable to have IV placed and received fluid  Remains on LR at 100 cc/h  Blood pressure remains elevated  Very withdrawn.  Still not eating or drinking.  Refusing to answer any questions/physical examination.  Per RN,  Took Abilify otherwise refused others medications  No seizure-like activity or tremor  Has not required IV benzodiazepines     Review of Systems  UTO; nonparticipating in question    Objective   Objective     Vitals:   Temp:  [97.9 °F (36.6 °C)-99.3 °F (37.4 °C)] 98.8 °F (37.1 °C)  Heart Rate:  [] 94  Resp:  [18-22] 18  BP: (117-148)/() 148/100  Physical Exam    Constitutional: Awake, resting in bed, withdrawn, avoidant eye contact, not conversant, NAD   Respiratory:  nonlabored respirations    Cardiovascular:  no edema   Gastrointestinal:  nondistended   Neurologic: Alert, minimal for  extremity spontaneously    Result Review    Result Review:  I have personally reviewed the following over the last 24 hours (07:00 to 07:00) and agree with the following findings  [x]  Laboratory  CBC          9/23/2023    17:51 5/3/2024    17:09 5/5/2024    04:48   CBC   WBC 9.72  11.61  15.24    RBC 3.91  4.61  4.46    Hemoglobin 11.1  13.7  13.2    Hematocrit 34.8  42.2  43.9    MCV 89.0  91.5  98.4    MCH 28.4  29.7  29.6    MCHC 31.9  32.5  30.1    RDW 13.9  13.3  13.8    Platelets 201  411  348      BMP          9/23/2023    17:51 5/3/2024    17:48 5/5/2024    04:48   BMP   BUN 11  16  28    Creatinine 0.76  0.79  1.20    Sodium 140  142  147    Potassium 3.6  3.4  3.4    Chloride 105  105  110    CO2 23.1  22.9  19.8    Calcium 9.0  8.6  9.1      []  Microbiology  []  Radiology   []  EKG/Telemetry   []  Cardiology/Vascular   []  Pathology  []  Old records  []  Other:    Assessment & Plan   Assessment / Plan     Assessment/Plan:  Dehydration  CHAR, prerenal  Hypernatremia  Sinus tachycardia  Uncontrolled hypertension  Leukocytosis  Hypokalemia  At risk for benzodiazepine withdrawal  Benzodiazepine abuse  Stimulant abuse   Unspecified neuropathy on gabapentin  Recurrent MDD with suicide ideation  Psychotic disorder, unspecified          Patient remains very withdrawn and has been refusing some intervention but was agreeable to have IV placed and IV fluids which are currently running. Has received close to 3 L.  Will switch to NS with 20 mEq/L at 100 cc/h as she refused oral electrolyte supplementation  If she is willing, continue Coreg 12.5 mg 2 times a day  Continue IV hydralazine 10 mg q6 hours prn SBP/DBP >180/100.  May end up needing to use clonidine patch temporarily.  Given tachycardia and elevated white count which is rising cannot exclude an infection.  No documented fever. Refusing to get a UA and chest ray at this time.  Will cover with Rocephin empirically.  Trend white count  I suspect her vitals are  more from dehydration and lack of taking blood pressure medication.  She was reportedly taking Valium up until admission and with a half-life of proximately 48 hours, should not be showing signs of severe withdrawal yet  Will continue to monitor CIWA score daily.    Has IV Ativan every 4 hours as needed   Psychiatry following. Continue one-to-one sitter. On Abilify  CBC, BMP in AM    Discussed plan with RN.    DVT prophylaxis:  Mechanical DVT prophylaxis orders are present.      CODE STATUS:   Code Status (Patient has no pulse and is not breathing): CPR (Attempt to Resuscitate)  Medical Interventions (Patient has pulse or is breathing): Full Support    Electronically signed by Nathan Talavera DO, 05/05/24, 1:48 PM EDT.

## 2024-05-05 NOTE — PLAN OF CARE
Goal Outcome Evaluation:  Plan of Care Reviewed With: other (see comments) (patient not alert and oriented)

## 2024-05-05 NOTE — PLAN OF CARE
Goal Outcome Evaluation:    Report given to Jennifer Steele at 1915. Pt transported via wheelchair, security, MHT and closewatch to room 380. Pt belongings were sent with the patient, via security. - AS RN

## 2024-05-05 NOTE — PLAN OF CARE
Goal Outcome Evaluation:  Plan of Care Reviewed With: patient        Progress: no change  Outcome Evaluation: Patient was encouraged to lay in bed and rest, was able to sleep overnight, waking intermitently. Heart rate remains slightly elevated in high 90's. Diastolic blood pressure also high, 148/100 map 112.  Diaphoresis decreased at this time. Patient recieved 2L fluid bolus this shift. Remains withdrawn, tearful, and does not make eye contact. Minimal exchange with nurse and , sometimes will respond with yes or no, other times no response. Patient displayed some slight tremors/movements in feet while sleeping. Fidgets/wrings hands, stares at feet when awake. Patient still has yet to void this shift. Plan of care to continue.

## 2024-05-05 NOTE — NURSING NOTE
After receiving patient at 13:00, patient had a blank stare on her face and would only slowly shake her head in a delayed reaction to answer questions. Her blood pressure remained high throughout duration of shift. MD aware and medication coverage provided. Patient blood pressure continued to be elevated. I was made aware by the sitter that the patient had not had a urine the entire shift or on night shift. I bladder scanned and she was >999 on scan. We ambulated to the toilet and she would not urinate. Gave her a few minutes and educated on straight cath if she did not use the bathroom. Patient expressed with motions and head nods that her abdomen was hurting and that she would take her medication. I left and came back patient was asleep and had used the bathroom on herself. I bladder scanned again and she had 942ml on scanner. I straight cath the patient with MD approval, collected UA and cleaned patient. Patient slept throughout the process of collecting urine and bed change. IV fluids and IV antibiotics given. Care ongoing

## 2024-05-06 LAB
ANION GAP SERPL CALCULATED.3IONS-SCNC: 10.5 MMOL/L (ref 5–15)
BUN SERPL-MCNC: 27 MG/DL (ref 6–20)
BUN/CREAT SERPL: 36.5 (ref 7–25)
CALCIUM SPEC-SCNC: 8.5 MG/DL (ref 8.6–10.5)
CHLORIDE SERPL-SCNC: 109 MMOL/L (ref 98–107)
CO2 SERPL-SCNC: 24.5 MMOL/L (ref 22–29)
CREAT SERPL-MCNC: 0.74 MG/DL (ref 0.57–1)
DEPRECATED RDW RBC AUTO: 47.2 FL (ref 37–54)
EGFRCR SERPLBLD CKD-EPI 2021: 93.9 ML/MIN/1.73
ERYTHROCYTE [DISTWIDTH] IN BLOOD BY AUTOMATED COUNT: 13.9 % (ref 12.3–15.4)
GLUCOSE SERPL-MCNC: 90 MG/DL (ref 65–99)
HCT VFR BLD AUTO: 38.4 % (ref 34–46.6)
HGB BLD-MCNC: 11.9 G/DL (ref 12–15.9)
MCH RBC QN AUTO: 29.5 PG (ref 26.6–33)
MCHC RBC AUTO-ENTMCNC: 31 G/DL (ref 31.5–35.7)
MCV RBC AUTO: 95.3 FL (ref 79–97)
PLATELET # BLD AUTO: 287 10*3/MM3 (ref 140–450)
PMV BLD AUTO: 9 FL (ref 6–12)
POTASSIUM SERPL-SCNC: 3.5 MMOL/L (ref 3.5–5.2)
QT INTERVAL: 422 MS
QTC INTERVAL: 510 MS
RBC # BLD AUTO: 4.03 10*6/MM3 (ref 3.77–5.28)
SODIUM SERPL-SCNC: 144 MMOL/L (ref 136–145)
WBC NRBC COR # BLD AUTO: 13.57 10*3/MM3 (ref 3.4–10.8)

## 2024-05-06 PROCEDURE — 85027 COMPLETE CBC AUTOMATED: CPT | Performed by: INTERNAL MEDICINE

## 2024-05-06 PROCEDURE — 25010000002 SODIUM CHLORIDE 0.9 % WITH KCL 20 MEQ 20-0.9 MEQ/L-% SOLUTION: Performed by: INTERNAL MEDICINE

## 2024-05-06 PROCEDURE — 25010000002 HYDRALAZINE PER 20 MG: Performed by: INTERNAL MEDICINE

## 2024-05-06 PROCEDURE — 93005 ELECTROCARDIOGRAM TRACING: CPT | Performed by: PHYSICIAN ASSISTANT

## 2024-05-06 PROCEDURE — 25010000002 ONDANSETRON PER 1 MG: Performed by: INTERNAL MEDICINE

## 2024-05-06 PROCEDURE — 25010000002 LORAZEPAM PER 2 MG: Performed by: INTERNAL MEDICINE

## 2024-05-06 PROCEDURE — 99232 SBSQ HOSP IP/OBS MODERATE 35: CPT | Performed by: INTERNAL MEDICINE

## 2024-05-06 PROCEDURE — 25010000002 CEFTRIAXONE PER 250 MG: Performed by: INTERNAL MEDICINE

## 2024-05-06 PROCEDURE — 25010000002 HALOPERIDOL LACTATE PER 5 MG: Performed by: PHYSICIAN ASSISTANT

## 2024-05-06 PROCEDURE — 80048 BASIC METABOLIC PNL TOTAL CA: CPT | Performed by: INTERNAL MEDICINE

## 2024-05-06 PROCEDURE — 93010 ELECTROCARDIOGRAM REPORT: CPT | Performed by: INTERNAL MEDICINE

## 2024-05-06 RX ORDER — ARIPIPRAZOLE 5 MG/1
20 TABLET ORAL DAILY
Status: DISCONTINUED | OUTPATIENT
Start: 2024-05-07 | End: 2024-05-07

## 2024-05-06 RX ORDER — HALOPERIDOL 5 MG/ML
1 INJECTION INTRAMUSCULAR ONCE
Status: COMPLETED | OUTPATIENT
Start: 2024-05-06 | End: 2024-05-06

## 2024-05-06 RX ORDER — LORAZEPAM 2 MG/ML
2 INJECTION INTRAMUSCULAR EVERY 4 HOURS PRN
Status: DISCONTINUED | OUTPATIENT
Start: 2024-05-06 | End: 2024-05-07

## 2024-05-06 RX ORDER — LORAZEPAM 2 MG/ML
1 INJECTION INTRAMUSCULAR ONCE
Status: COMPLETED | OUTPATIENT
Start: 2024-05-06 | End: 2024-05-06

## 2024-05-06 RX ORDER — LORAZEPAM 2 MG/ML
1 INJECTION INTRAMUSCULAR ONCE AS NEEDED
Status: DISCONTINUED | OUTPATIENT
Start: 2024-05-06 | End: 2024-05-06

## 2024-05-06 RX ADMIN — HALOPERIDOL LACTATE 1 MG: 5 INJECTION, SOLUTION INTRAMUSCULAR at 03:13

## 2024-05-06 RX ADMIN — AMITRIPTYLINE HYDROCHLORIDE 25 MG: 25 TABLET, FILM COATED ORAL at 21:44

## 2024-05-06 RX ADMIN — LORAZEPAM 1 MG: 2 INJECTION INTRAMUSCULAR; INTRAVENOUS at 01:44

## 2024-05-06 RX ADMIN — POTASSIUM CHLORIDE AND SODIUM CHLORIDE 100 ML/HR: 900; 150 INJECTION, SOLUTION INTRAVENOUS at 01:44

## 2024-05-06 RX ADMIN — POTASSIUM CHLORIDE AND SODIUM CHLORIDE 100 ML/HR: 900; 150 INJECTION, SOLUTION INTRAVENOUS at 16:35

## 2024-05-06 RX ADMIN — GABAPENTIN 300 MG: 300 CAPSULE ORAL at 10:37

## 2024-05-06 RX ADMIN — LORAZEPAM 1 MG: 2 INJECTION INTRAMUSCULAR; INTRAVENOUS at 17:59

## 2024-05-06 RX ADMIN — CEFTRIAXONE SODIUM 1000 MG: 1 INJECTION, POWDER, FOR SOLUTION INTRAMUSCULAR; INTRAVENOUS at 13:19

## 2024-05-06 RX ADMIN — LORAZEPAM 1 MG: 2 INJECTION INTRAMUSCULAR; INTRAVENOUS at 16:29

## 2024-05-06 RX ADMIN — Medication 10 ML: at 21:45

## 2024-05-06 RX ADMIN — ARIPIPRAZOLE 10 MG: 5 TABLET ORAL at 10:37

## 2024-05-06 RX ADMIN — CARVEDILOL 12.5 MG: 12.5 TABLET, FILM COATED ORAL at 10:37

## 2024-05-06 RX ADMIN — HYDRALAZINE HYDROCHLORIDE 10 MG: 20 INJECTION, SOLUTION INTRAMUSCULAR; INTRAVENOUS at 16:29

## 2024-05-06 NOTE — NURSING NOTE
Patient became increasingly aggitated around 0000, complaining of visual and tactile disturbances. Some of her disturbances pointed toward abdomen/bladder. Patient had not voided since 1600 and had approx 1L fluids via IV since that time. Bladder scan showed 425. Patient was medicated with Ativan, with little relief. Additional order was placed for Haldol. Haldol was administered, and patient was straight cathed with 550 output of dark yellow urine. Patient tolerated the procedure well, and expressed signs of relief afterward. Patient now resting comfortably, with some movement and talking during sleep. Close watch remains at bedside.  Plan of care ongoing.

## 2024-05-06 NOTE — PROGRESS NOTES
[facility]   Hospitalist Progress Note  Date: 2024  Patient Name: Anjali Medina  : 1965  MRN: [id number]  Date of admission: 2024      Subjective   Subjective     Chief Complaint: Follow up for high blood pressure and heart rate    Summary: 58 y.o. female with hypertension, chronic benzodiazepine use, history of cocaine/methamphetamine use, depression who was admitted to psychiatric unit in setting of worsening depression, suicidal ideations and recent self-harm by cutting her wrist.  Per reports she had endorsed auditory hallucinations. UDS was positive for benzodiazepines and cocaine.  She is prescribed Valium 5 mg every 8 hours as needed. Ethanol, Tylenol and aspirin level negative.  White count 11.6 otherwise unremarkable.  Potassium 3.4 otherwise CMP unremarkable. Per psychiatry , plan was to wean her off benzodiazepines slowly.  She was restarted on home amitriptyline and Abilify was added.  Had RRT called due to tachycardia and hypertension.  Had been refusing to eat and drink and was dehydrated.  Transfer to medical floor for intervention    Interval Followup:   Yesterday, required in and out cath x1 for retention.  Became agitated last night requiring Haldol which allowed her to sleep.  This morning was still lethargic.  Did take morning medications.  Still not wanting to eat or drink much  Remains afebrile.  Did take Coreg and blood pressure controlled now  CIWA score of 3    Review of Systems  UTO; not nonparticipating in question    Objective   Objective     Vitals:   Temp:  [97.4 °F (36.3 °C)-98.9 °F (37.2 °C)] 97.4 °F (36.3 °C)  Heart Rate:  [69-96] 69  Resp:  [18-20] 20  BP: (121-170)/() 155/95  Physical Exam    Constitutional: resting in bed, withdrawn, not very conversant, NAD   Respiratory:  nonlabored respirations    Cardiovascular:  no edema   Gastrointestinal:  nondistended    Result Review    Result Review:  I have personally reviewed the following over the last 24  hours (07:00 to 07:00) and agree with the following findings  [x]  Laboratory  CBC          5/3/2024    17:09 5/5/2024    04:48 5/6/2024    05:02   CBC   WBC 11.61  15.24  13.57    RBC 4.61  4.46  4.03    Hemoglobin 13.7  13.2  11.9    Hematocrit 42.2  43.9  38.4    MCV 91.5  98.4  95.3    MCH 29.7  29.6  29.5    MCHC 32.5  30.1  31.0    RDW 13.3  13.8  13.9    Platelets 411  348  287      BMP          5/3/2024    17:48 5/5/2024    04:48 5/6/2024    05:02   BMP   BUN 16  28  27    Creatinine 0.79  1.20  0.74    Sodium 142  147  144    Potassium 3.4  3.4  3.5    Chloride 105  110  109    CO2 22.9  19.8  24.5    Calcium 8.6  9.1  8.5      [x]  Microbiology  Urine culture >100,000 CFU gram-negative bacilli  []  Radiology   []  EKG/Telemetry   []  Cardiology/Vascular   []  Pathology  []  Old records  [x]  Other:   Intake/Output Summary (Last 24 hours) at 5/6/2024 1358  Last data filed at 5/6/2024 0853  Gross per 24 hour   Intake 240 ml   Output 1850 ml   Net -1610 ml         Assessment & Plan   Assessment / Plan     Assessment/Plan:  Dehydration  CHAR, prerenal  Hypernatremia  Sinus tachycardia  Uncontrolled hypertension  Urinary retention  UTI secondary to gram-negative bacilli  Leukocytosis  Hypokalemia  At risk for benzodiazepine withdrawal  Benzodiazepine abuse  Stimulant abuse   Unspecified neuropathy on gabapentin  Recurrent MDD with suicide ideation  Psychotic disorder, unspecified          Sodium and potassium normalized.  Creatinine improved. Continue IV fluids.  Encourage p.o. intake  Monitor bladder scans every 6 hours.  In-N-Out cath for retention more than 400cc  White count coming down after addition of Rocephin.  Urine culture is positive.  Awaiting speciation.  Continue Rocephin, day 2.  Trend white count  Continue Coreg 12.5 mg 2 times a day.  BP is controlled if she takes it Continue IV hydralazine 10 mg q6 hours prn SBP/DBP >180/100.    I suspect her initial unstable vitals were more from dehydration  and lack of taking blood pressure medication.  She was reportedly taking Valium up until admission and with a half-life of proximately 48 hours, should not be showing signs of severe withdrawal yet  Most recent CIWA score of 3. Will continue to monitor CIWA score daily.    Has IV Ativan every 4 hours as needed   Psychiatry following. Continue one-to-one sitter. On Abilify  CBC, BMP in AM    Discussed with Dr. Matias.  Pending morning blood work and vitals, patient should be appropriate for transfer back to [facility] tomorrow    Discussed plan with RN.    DVT prophylaxis:  Mechanical DVT prophylaxis orders are present.      CODE STATUS:   Code Status (Patient has no pulse and is not breathing): CPR (Attempt to Resuscitate)  Medical Interventions (Patient has pulse or is breathing): Full Support    Electronically signed by Nathan Talavera DO, 05/06/24, 2:01 PM EDT.

## 2024-05-06 NOTE — PROGRESS NOTES
" [facility]     Psychiatric Progress Note    Patient Name: Anjali Medina  : 1965  MRN: [id number]  Primary Care Physician:  Taina Cheema APRN  Date of admission: 2024    Subjective   Subjective     Patient seen and chart reviewed, discussed with staff.    Chief Complaint: Depression, psychosis      HPI:     Patient is more engaging than the documentation of the last 2 days indicates.  She does continue to have a bizarre affect.  Continues to be slow to respond to questions.  Gives minimal responses and is somewhat difficult to engage.  Reports being depressed but states \"I do not know\" when asked about suicidal ideations.  It is a bit loud and bizarre affect.      Objective   Objective     Vitals:   Temp:  [97.4 °F (36.3 °C)-98.9 °F (37.2 °C)] 97.4 °F (36.3 °C)  Heart Rate:  [69-96] 69  Resp:  [18-20] 20  BP: (121-170)/() 155/95          Mental Status Exam:      Appearance:   Sitting up comfortably in bed, no agitation, difficult to engage  Reliability:   Limited  Eye Contact:   Fair  Concentration/Focus:    Attentive, but easily distracted, slow to respond  Behaviors:    No agitation  Memory :    Intact  Speech:    Nonspontaneous, decreased tone, minimal  Language:   Appropriate  Mood :    \"Scary\"  Affect:    Blunted  Thought process:    Tangential, guarded, thought blocking  Thought Content:   Does not know she is having suicidal ideations, no homicidal ideations, gets distracted and may be having hallucinations   Insight:   Limited  Judgement:    Intact      Result Review    Result Review:  I have personally reviewed the results from the time of this admission to 2024 15:16 EDT and agree with these findings:  []  Laboratory  []  Microbiology  []  Radiology  []  EKG/Telemetry   []  Cardiology/Vascular   []  Pathology  []  Old records  []  Other:  Most notable findings include:     Lab Results (last 24 hours)       Procedure Component Value Units Date/Time    Urine Culture - " Urine, Straight Cath [[id number]]  (Abnormal) Collected: 05/05/24 1659    Specimen: Urine from Straight Cath Updated: 05/06/24 1014     Urine Culture >100,000 CFU/mL Gram Negative Bacilli    Narrative:      Colonization of the urinary tract without infection is common. Treatment is discouraged unless the patient is symptomatic, pregnant, or undergoing an invasive urologic procedure.    Basic Metabolic Panel [[id number]]  (Abnormal) Collected: 05/06/24 0502    Specimen: Blood from Arm, Right Updated: 05/06/24 0541     Glucose 90 mg/dL      BUN 27 mg/dL      Creatinine 0.74 mg/dL      Sodium 144 mmol/L      Potassium 3.5 mmol/L      Chloride 109 mmol/L      CO2 24.5 mmol/L      Calcium 8.5 mg/dL      BUN/Creatinine Ratio 36.5     Anion Gap 10.5 mmol/L      eGFR 93.9 mL/min/1.73     Narrative:      GFR Normal >60  Chronic Kidney Disease <60  Kidney Failure <15      CBC (No Diff) [[id number]]  (Abnormal) Collected: 05/06/24 0502    Specimen: Blood from Arm, Right Updated: 05/06/24 0520     WBC 13.57 10*3/mm3      RBC 4.03 10*6/mm3      Hemoglobin 11.9 g/dL      Hematocrit 38.4 %      MCV 95.3 fL      MCH 29.5 pg      MCHC 31.0 g/dL      RDW 13.9 %      RDW-SD 47.2 fl      MPV 9.0 fL      Platelets 287 10*3/mm3     Urinalysis, Microscopic Only - Straight Cath [[id number]]  (Abnormal) Collected: 05/05/24 1659    Specimen: Urine from Straight Cath Updated: 05/05/24 1714     RBC, UA 0-2 /HPF      WBC, UA 0-2 /HPF      Bacteria, UA 4+ /HPF      Squamous Epithelial Cells, UA 0-2 /HPF      Hyaline Casts, UA None Seen /LPF      Methodology Automated Microscopy    Urinalysis With Microscopic If Indicated (No Culture) - Straight Cath [[id number]]  (Abnormal) Collected: 05/05/24 1659    Specimen: Urine from Straight Cath Updated: 05/05/24 1713     Color, UA Yellow     Appearance, UA Cloudy     pH, UA 6.5     Specific Gravity, UA 1.014     Glucose, UA Negative     Ketones, UA Trace     Bilirubin, UA Negative     Blood, UA Small (1+)      Protein, UA 30 mg/dL (1+)     Leuk Esterase, UA Trace     Nitrite, UA Negative     Urobilinogen, UA 1.0 E.U./dL                Medications:   amitriptyline, 25 mg, Oral, Nightly  ARIPiprazole, 10 mg, Oral, Daily  carvedilol, 12.5 mg, Oral, BID With Meals  cefTRIAXone, 1,000 mg, Intravenous, Q24H  gabapentin, 300 mg, Oral, Q12H  sodium chloride, 10 mL, Intravenous, Q12H          Assessment / Plan       Active Hospital Problems:  Active Hospital Problems    Diagnosis    • **Dehydration        Plan:     Increase aripiprazole to 15 mg  Discuss case with Dr. Talavera and if he continues to stabilize we will readmit to [facility]  Continue sitter for now for possible loose nations and bizarre behavior      Disposition:       Part of this note may be an electronic transcription/translation of spoken language to printed text using the Dragon dictation system.         Electronically signed by Aaron Matias MD, 05/06/24, 3:16 PM EDT.

## 2024-05-06 NOTE — PLAN OF CARE
Goal Outcome Evaluation:                      Disoriented to place this shift. Blood pressures elevated, PRN med given. Medicated per MAR for anxiety. Refused bladder scan this shift. No new orders at this time.

## 2024-05-06 NOTE — PLAN OF CARE
Goal Outcome Evaluation:  Plan of Care Reviewed With: patient        Progress: improving  Outcome Evaluation: Patient was withdrawn and slow to respond to questions this shift. Refused assessment outside of heart and lung ascultation. Day shift nurse reported that patient has refused Coreg at time it was due, but to make additional attempt to encourgae patient to take the medication. Patient was agreeable to Coreg and Elavil. Later in the shift patient became aggitated and experienced visual and tactile hallucinations, administered PRN Ativan with little relief. Contacted provider inquiring about additional medication and well as for an order to straight cath. Administered Haldol, which allowed patient to sleep more restfully. Straight cathed patient due to inability to void for 10+ hours, despite PO intake and IV fluids of over 1L. Evacuated 550 of dark urine. Patient was noticeably more comfortable afterward and slept for remainder of shift. Plan of care to continue.

## 2024-05-07 ENCOUNTER — APPOINTMENT (OUTPATIENT)
Dept: CT IMAGING | Facility: HOSPITAL | Age: 59
DRG: 881 | End: 2024-05-07
Payer: COMMERCIAL

## 2024-05-07 LAB
ANION GAP SERPL CALCULATED.3IONS-SCNC: 13.5 MMOL/L (ref 5–15)
BACTERIA SPEC AEROBE CULT: ABNORMAL
BUN SERPL-MCNC: 14 MG/DL (ref 6–20)
BUN/CREAT SERPL: 25.9 (ref 7–25)
CALCIUM SPEC-SCNC: 8.4 MG/DL (ref 8.6–10.5)
CHLORIDE SERPL-SCNC: 105 MMOL/L (ref 98–107)
CO2 SERPL-SCNC: 21.5 MMOL/L (ref 22–29)
CREAT SERPL-MCNC: 0.54 MG/DL (ref 0.57–1)
CRP SERPL-MCNC: 1.49 MG/DL (ref 0–0.5)
DEPRECATED RDW RBC AUTO: 44.5 FL (ref 37–54)
EGFRCR SERPLBLD CKD-EPI 2021: 106.9 ML/MIN/1.73
ERYTHROCYTE [DISTWIDTH] IN BLOOD BY AUTOMATED COUNT: 13.3 % (ref 12.3–15.4)
GLUCOSE SERPL-MCNC: 85 MG/DL (ref 65–99)
HCT VFR BLD AUTO: 36.8 % (ref 34–46.6)
HGB BLD-MCNC: 11.8 G/DL (ref 12–15.9)
MAGNESIUM SERPL-MCNC: 1.7 MG/DL (ref 1.6–2.6)
MCH RBC QN AUTO: 29.9 PG (ref 26.6–33)
MCHC RBC AUTO-ENTMCNC: 32.1 G/DL (ref 31.5–35.7)
MCV RBC AUTO: 93.4 FL (ref 79–97)
PLATELET # BLD AUTO: 271 10*3/MM3 (ref 140–450)
PMV BLD AUTO: 9.4 FL (ref 6–12)
POTASSIUM SERPL-SCNC: 3.2 MMOL/L (ref 3.5–5.2)
RBC # BLD AUTO: 3.94 10*6/MM3 (ref 3.77–5.28)
SODIUM SERPL-SCNC: 140 MMOL/L (ref 136–145)
T4 FREE SERPL-MCNC: 1.46 NG/DL (ref 0.92–1.68)
TSH SERPL DL<=0.05 MIU/L-ACNC: 4.33 UIU/ML (ref 0.27–4.2)
WBC NRBC COR # BLD AUTO: 13.17 10*3/MM3 (ref 3.4–10.8)

## 2024-05-07 PROCEDURE — 70450 CT HEAD/BRAIN W/O DYE: CPT

## 2024-05-07 PROCEDURE — 25010000002 HYDRALAZINE PER 20 MG: Performed by: INTERNAL MEDICINE

## 2024-05-07 PROCEDURE — 85027 COMPLETE CBC AUTOMATED: CPT | Performed by: INTERNAL MEDICINE

## 2024-05-07 PROCEDURE — 25010000002 CEFTRIAXONE PER 250 MG: Performed by: STUDENT IN AN ORGANIZED HEALTH CARE EDUCATION/TRAINING PROGRAM

## 2024-05-07 PROCEDURE — 25010000002 LORAZEPAM PER 2 MG: Performed by: INTERNAL MEDICINE

## 2024-05-07 PROCEDURE — 83735 ASSAY OF MAGNESIUM: CPT | Performed by: INTERNAL MEDICINE

## 2024-05-07 PROCEDURE — 84443 ASSAY THYROID STIM HORMONE: CPT | Performed by: STUDENT IN AN ORGANIZED HEALTH CARE EDUCATION/TRAINING PROGRAM

## 2024-05-07 PROCEDURE — 84439 ASSAY OF FREE THYROXINE: CPT | Performed by: STUDENT IN AN ORGANIZED HEALTH CARE EDUCATION/TRAINING PROGRAM

## 2024-05-07 PROCEDURE — 25010000002 SODIUM CHLORIDE 0.9 % WITH KCL 20 MEQ 20-0.9 MEQ/L-% SOLUTION: Performed by: INTERNAL MEDICINE

## 2024-05-07 PROCEDURE — 99232 SBSQ HOSP IP/OBS MODERATE 35: CPT | Performed by: STUDENT IN AN ORGANIZED HEALTH CARE EDUCATION/TRAINING PROGRAM

## 2024-05-07 PROCEDURE — 86140 C-REACTIVE PROTEIN: CPT | Performed by: STUDENT IN AN ORGANIZED HEALTH CARE EDUCATION/TRAINING PROGRAM

## 2024-05-07 PROCEDURE — 25010000002 LORAZEPAM PER 2 MG: Performed by: PHYSICIAN ASSISTANT

## 2024-05-07 PROCEDURE — 25010000002 ONDANSETRON PER 1 MG: Performed by: INTERNAL MEDICINE

## 2024-05-07 PROCEDURE — 82948 REAGENT STRIP/BLOOD GLUCOSE: CPT

## 2024-05-07 PROCEDURE — 80048 BASIC METABOLIC PNL TOTAL CA: CPT | Performed by: INTERNAL MEDICINE

## 2024-05-07 RX ORDER — LORAZEPAM 2 MG/ML
2 INJECTION INTRAMUSCULAR EVERY 4 HOURS PRN
Status: DISCONTINUED | OUTPATIENT
Start: 2024-05-07 | End: 2024-05-09

## 2024-05-07 RX ORDER — POTASSIUM CHLORIDE 750 MG/1
20 CAPSULE, EXTENDED RELEASE ORAL ONCE
Status: COMPLETED | OUTPATIENT
Start: 2024-05-07 | End: 2024-05-07

## 2024-05-07 RX ADMIN — LORAZEPAM 2 MG: 2 INJECTION INTRAMUSCULAR; INTRAVENOUS at 10:44

## 2024-05-07 RX ADMIN — Medication 10 ML: at 20:10

## 2024-05-07 RX ADMIN — QUETIAPINE FUMARATE 300 MG: 200 TABLET ORAL at 20:30

## 2024-05-07 RX ADMIN — POTASSIUM CHLORIDE 20 MEQ: 750 CAPSULE, EXTENDED RELEASE ORAL at 09:36

## 2024-05-07 RX ADMIN — HYDRALAZINE HYDROCHLORIDE 10 MG: 20 INJECTION, SOLUTION INTRAMUSCULAR; INTRAVENOUS at 04:38

## 2024-05-07 RX ADMIN — LORAZEPAM 2 MG: 2 INJECTION INTRAMUSCULAR; INTRAVENOUS at 18:29

## 2024-05-07 RX ADMIN — ONDANSETRON 4 MG: 2 INJECTION INTRAMUSCULAR; INTRAVENOUS at 10:06

## 2024-05-07 RX ADMIN — POTASSIUM CHLORIDE AND SODIUM CHLORIDE 100 ML/HR: 900; 150 INJECTION, SOLUTION INTRAVENOUS at 13:29

## 2024-05-07 RX ADMIN — LORAZEPAM 2 MG: 2 INJECTION INTRAMUSCULAR; INTRAVENOUS at 02:45

## 2024-05-07 RX ADMIN — ACETAMINOPHEN 650 MG: 325 TABLET ORAL at 09:59

## 2024-05-07 RX ADMIN — GABAPENTIN 300 MG: 300 CAPSULE ORAL at 20:30

## 2024-05-07 RX ADMIN — POTASSIUM CHLORIDE AND SODIUM CHLORIDE 100 ML/HR: 900; 150 INJECTION, SOLUTION INTRAVENOUS at 02:46

## 2024-05-07 RX ADMIN — CEFTRIAXONE SODIUM 1000 MG: 1 INJECTION, POWDER, FOR SOLUTION INTRAMUSCULAR; INTRAVENOUS at 16:33

## 2024-05-07 RX ADMIN — ARIPIPRAZOLE 20 MG: 5 TABLET ORAL at 09:36

## 2024-05-07 RX ADMIN — CARVEDILOL 12.5 MG: 12.5 TABLET, FILM COATED ORAL at 09:35

## 2024-05-07 RX ADMIN — LORAZEPAM 2 MG: 2 INJECTION INTRAMUSCULAR; INTRAVENOUS at 06:19

## 2024-05-07 RX ADMIN — GABAPENTIN 300 MG: 300 CAPSULE ORAL at 09:35

## 2024-05-07 NOTE — PROGRESS NOTES
[facility]   Hospitalist Progress Note  Date: 2024  Patient Name: Anjali Medina  : 1965  MRN: [id number]  Date of admission: 2024  Room/Bed: [facility]/1      Subjective   Subjective     Chief Complaint: High blood pressure, tachycardia    Summary:Anjali Medina is a 58 y.o. female with hypertension, chronic benzodiazepine use, history of cocaine/methamphetamine use, depression who was admitted to psychiatric unit in setting of worsening depression, suicidal ideations and recent self-harm by cutting her wrist. Per reports she had endorsed auditory hallucinations. UDS was positive for benzodiazepines and cocaine. She is prescribed Valium 5 mg every 8 hours as needed. Ethanol, Tylenol and aspirin level negative. White count 11.6 otherwise unremarkable. Potassium 3.4 otherwise CMP unremarkable. Per psychiatry , plan was to wean her off benzodiazepines slowly. She was restarted on home amitriptyline and Abilify was added. Had RRT called due to tachycardia and hypertension. Had been refusing to eat and drink and was dehydrated. Transfer to medical floor for intervention     Interval Followup: No acute overnight events per nursing.  She is a one-to-one in the room.  They report that she had some agitation last night.  Received 4 mg Ativan, pretty somnolent this morning.  She is awake and moving all extremities but is not answering any questions.  No family at bedside.    Review of Systems    Not obtained due to mental status    Objective   Objective     Vitals:   Temp:  [98.4 °F (36.9 °C)-99.1 °F (37.3 °C)] 98.4 °F (36.9 °C)  Heart Rate:  [] 77  Resp:  [18-24] 18  BP: (122-168)/() 139/85    Physical Exam   Gen: NAD, awake  Cards: RRR, no murmur   Pulm: CTA b/l, no wheezing  Abd: soft, nondistended, nontender  Extremities: no pitting edema    Result Review    Result Review:  I have personally reviewed these results:  [x]  Laboratory      Lab 24  0455 24  0502 24  5469  05/03/24  1709   WBC 13.17* 13.57* 15.24* 11.61*   HEMOGLOBIN 11.8* 11.9* 13.2 13.7   HEMATOCRIT 36.8 38.4 43.9 42.2   PLATELETS 271 287 348 411   NEUTROS ABS  --   --   --  5.84   IMMATURE GRANS (ABS)  --   --   --  0.02   LYMPHS ABS  --   --   --  5.22*   MONOS ABS  --   --   --  0.37   EOS ABS  --   --   --  0.07   MCV 93.4 95.3 98.4* 91.5   PROCALCITONIN  --   --  0.15  --          Lab 05/07/24  0455 05/06/24  0502 05/05/24  0448 05/03/24  1748   SODIUM 140 144 147* 142   POTASSIUM 3.2* 3.5 3.4* 3.4*   CHLORIDE 105 109* 110* 105   CO2 21.5* 24.5 19.8* 22.9   ANION GAP 13.5 10.5 17.2* 14.1   BUN 14 27* 28* 16   CREATININE 0.54* 0.74 1.20* 0.79   EGFR 106.9 93.9 52.6* 86.8   GLUCOSE 85 90 125* 113*   CALCIUM 8.4* 8.5* 9.1 8.6   MAGNESIUM 1.7  --  2.2 1.8         Lab 05/03/24  1748   TOTAL PROTEIN 7.0   ALBUMIN 3.9   GLOBULIN 3.1   ALT (SGPT) 12   AST (SGOT) 15   BILIRUBIN 0.3   ALK PHOS 135*                     Brief Urine Lab Results  (Last result in the past 365 days)        Color   Clarity   Blood   Leuk Est   Nitrite   Protein   CREAT   Urine HCG        05/05/24 1659 Yellow   Cloudy   Small (1+)   Trace   Negative   30 mg/dL (1+)                 [x]  Microbiology   Microbiology Results (last 10 days)       Procedure Component Value - Date/Time    Urine Culture - Urine, Straight Cath [[id number]]  (Abnormal)  (Susceptibility) Collected: 05/05/24 1659    Lab Status: Final result Specimen: Urine from Straight Cath Updated: 05/07/24 1007     Urine Culture >100,000 CFU/mL Escherichia coli    Narrative:      Colonization of the urinary tract without infection is common. Treatment is discouraged unless the patient is symptomatic, pregnant, or undergoing an invasive urologic procedure.    Susceptibility        Escherichia coli      ANTONINA      Amoxicillin + Clavulanate Intermediate      Ampicillin Resistant      Ampicillin + Sulbactam Resistant      Cefazolin Susceptible      Cefepime Susceptible      Ceftazidime  Susceptible      Ceftriaxone Susceptible      Gentamicin Susceptible      Levofloxacin Resistant      Nitrofurantoin Susceptible      Piperacillin + Tazobactam Susceptible      Trimethoprim + Sulfamethoxazole Resistant                                 [x]  Radiology  XR Chest 1 View    Result Date: 5/5/2024  No active disease.   Electronically Signed By-PHILIP JOHNSON MD On:5/5/2024 6:02 PM     []  EKG/Telemetry   []  Cardiology/Vascular   []  Pathology  []  Old records  []  Other:    Assessment & Plan   Assessment / Plan     Assessment:  Altered mental status  Dehydration, resolved  Prerenal CHAR, resolved  Hypernatremia, resolved  Sinus tachycardia, resolved  Hypertension  Urinary retention  Acute cystitis secondary to E. coli  Leukocytosis  Hypokalemia  Chronic benzodiazepine abuse  Stimulant abuse  Neuropathy on gabapentin  Recurrent MDD with suicidal ideation      Plan:  Continue inpatient admission  Sodium improved after IV fluids  Potassium 3.2, p.o. replacement ordered  Leukocytosis is stable  Continue every 6 hour bladder scans.  No further straight caths required today.  Urine culture positive for E. coli.  Continue IV Rocephin for 3 days.  Continue Coreg 12.5 mg twice daily.  Blood pressures improving.  Psych following.  Stopped Abilify/amitriptyline and started Seroquel.  Continue one-to-one sitter.  Plan to discharge back to [facility] once medically stable.  Unclear why patient's mental status is not improving.  Will check routine labs with TSH, B12, inflammatory markers.  CT head without contrast.  EEG ordered.  A.m. labs to monitor hemoglobin electrolytes       Discussed with RN.    DVT prophylaxis:  Mechanical DVT prophylaxis orders are present.        CODE STATUS:   Code Status (Patient has no pulse and is not breathing): CPR (Attempt to Resuscitate)  Medical Interventions (Patient has pulse or is breathing): Full Support      Electronically signed by Janki Schneider DO, 5/7/2024, 17:19  EDT.

## 2024-05-07 NOTE — PROGRESS NOTES
" [facility]     Psychiatric Progress Note    Patient Name: Anjali Medina  : 1965  MRN: [id number]  Primary Care Physician:  Taina Cheema APRN  Date of admission: 2024    Subjective   Subjective     Patient seen and chart reviewed, discussed with staff.    Chief Complaint: Depression, confusion      HPI:     Patient continues to be more confused and disoriented.  She is very unsteady on her feet.  Could not walk to the restroom and had to be put on a bedpan.  She has had decreased oral intake of both food and liquid.    He has received 7 mg of lorazepam since midnight.  She has a long history of being on benzodiazepines and suspect she may be going into benzodiazepine withdrawal with her increasing confusion and agitation.    She is sleeping today and arouses briefly for interview.  States that she feels \"rough\" to be restless, agitated, and confused.      Objective   Objective     Vitals:   Temp:  [97.4 °F (36.3 °C)-99.1 °F (37.3 °C)] 98.5 °F (36.9 °C)  Heart Rate:  [] 92  Resp:  [18-24] 24  BP: (122-168)/() 145/108          Mental Status Exam:      Appearance:   Sleeping, difficult to arouse,  Reliability:   Limited  Eye Contact:   Limited  Concentration/Focus:    Inattentive  Behaviors:    Restless  Memory :    Unable to fully assess  Speech:    Minimal, decreased tone  Language:   No cursing  Mood :    \"Rough\"  Affect:    Angry with stated mood and constricted  Thought process:    Patient sleeping difficult to keep aroused for interview but has been confused and disoriented according to staff  Thought Content:    No suicidal or homicidal ideation, may be hallucinating  Insight:   Limited  Judgement:    Limited      Result Review    Result Review:  I have personally reviewed the results from the time of this admission to 2024 11:39 EDT and agree with these findings:  []  Laboratory  []  Microbiology  []  Radiology  []  EKG/Telemetry   []  Cardiology/Vascular   []  " Pathology  []  Old records  []  Other:  Most notable findings include:     Lab Results (last 24 hours)       Procedure Component Value Units Date/Time    Urine Culture - Urine, Straight Cath [[id number]]  (Abnormal)  (Susceptibility) Collected: 05/05/24 1659    Specimen: Urine from Straight Cath Updated: 05/07/24 1007     Urine Culture >100,000 CFU/mL Escherichia coli    Narrative:      Colonization of the urinary tract without infection is common. Treatment is discouraged unless the patient is symptomatic, pregnant, or undergoing an invasive urologic procedure.    Susceptibility        Escherichia coli      ANTONINA      Amoxicillin + Clavulanate Intermediate      Ampicillin Resistant      Ampicillin + Sulbactam Resistant      Cefazolin Susceptible      Cefepime Susceptible      Ceftazidime Susceptible      Ceftriaxone Susceptible      Gentamicin Susceptible      Levofloxacin Resistant      Nitrofurantoin Susceptible      Piperacillin + Tazobactam Susceptible      Trimethoprim + Sulfamethoxazole Resistant                           Basic Metabolic Panel [[id number]]  (Abnormal) Collected: 05/07/24 0455    Specimen: Blood from Arm, Left Updated: 05/07/24 0643     Glucose 85 mg/dL      BUN 14 mg/dL      Creatinine 0.54 mg/dL      Sodium 140 mmol/L      Potassium 3.2 mmol/L      Chloride 105 mmol/L      CO2 21.5 mmol/L      Calcium 8.4 mg/dL      BUN/Creatinine Ratio 25.9     Anion Gap 13.5 mmol/L      eGFR 106.9 mL/min/1.73     Narrative:      GFR Normal >60  Chronic Kidney Disease <60  Kidney Failure <15      Magnesium [[id number]]  (Normal) Collected: 05/07/24 0455    Specimen: Blood from Arm, Left Updated: 05/07/24 0643     Magnesium 1.7 mg/dL     CBC (No Diff) [[id number]]  (Abnormal) Collected: 05/07/24 0455    Specimen: Blood from Arm, Left Updated: 05/07/24 0624     WBC 13.17 10*3/mm3      RBC 3.94 10*6/mm3      Hemoglobin 11.8 g/dL      Hematocrit 36.8 %      MCV 93.4 fL      MCH 29.9 pg      MCHC 32.1 g/dL      RDW  13.3 %      RDW-SD 44.5 fl      MPV 9.4 fL      Platelets 271 10*3/mm3                 Medications:   carvedilol, 12.5 mg, Oral, BID With Meals  cefTRIAXone, 1,000 mg, Intravenous, Q24H  gabapentin, 300 mg, Oral, Q12H  QUEtiapine, 300 mg, Oral, BID  sodium chloride, 10 mL, Intravenous, Q12H          Assessment / Plan       Active Hospital Problems:  Active Hospital Problems    Diagnosis    • **Dehydration        Plan:     Patient with UTI could be contributing to confusion  Discontinue aripiprazole for effectiveness and add quetiapine 300 mg twice daily to help with confusion and agitation and restlessness  Discontinue Elavil  Patient has had decreased p.o. intake, continues to have an elevated white count, unsteady on her feet would be very difficult to manage on [facility]  We will follow make treatment recommendations as indicated  Once more stable would consider transfer back to [facility]      Disposition:      Part of this note may be an electronic transcription/translation of spoken language to printed text using the Dragon dictation system.         Electronically signed by Aaron Matias MD, 05/07/24, 11:39 AM EDT.

## 2024-05-07 NOTE — PLAN OF CARE
Goal Outcome Evaluation:      Pt became more confused throughout the night, at the beginning of shift she thought she was holding a baby.  A few hours later pt asked why she had a baby, then started getting anxious and fidgeting around trying to pull out iv. Safety sitter at bedside. MD notified when ciwa numbers went to 11, retimed ativan. Pts BP went to 168/102, gave bp meds and bp dropped to  122/56.

## 2024-05-08 ENCOUNTER — APPOINTMENT (OUTPATIENT)
Dept: NEUROLOGY | Facility: HOSPITAL | Age: 59
DRG: 881 | End: 2024-05-08
Payer: COMMERCIAL

## 2024-05-08 LAB
ANION GAP SERPL CALCULATED.3IONS-SCNC: 16.2 MMOL/L (ref 5–15)
ANISOCYTOSIS BLD QL: ABNORMAL
BUN SERPL-MCNC: 12 MG/DL (ref 6–20)
BUN/CREAT SERPL: 21.1 (ref 7–25)
CALCIUM SPEC-SCNC: 8.5 MG/DL (ref 8.6–10.5)
CHLORIDE SERPL-SCNC: 102 MMOL/L (ref 98–107)
CO2 SERPL-SCNC: 19.8 MMOL/L (ref 22–29)
CREAT SERPL-MCNC: 0.57 MG/DL (ref 0.57–1)
DEPRECATED RDW RBC AUTO: 45 FL (ref 37–54)
EGFRCR SERPLBLD CKD-EPI 2021: 105.5 ML/MIN/1.73
EOSINOPHIL # BLD MANUAL: 0.21 10*3/MM3 (ref 0–0.4)
EOSINOPHIL NFR BLD MANUAL: 2 % (ref 0.3–6.2)
ERYTHROCYTE [DISTWIDTH] IN BLOOD BY AUTOMATED COUNT: 13.3 % (ref 12.3–15.4)
ERYTHROCYTE [SEDIMENTATION RATE] IN BLOOD: 33 MM/HR (ref 0–30)
GIANT PLATELETS: ABNORMAL
GLUCOSE BLDC GLUCOMTR-MCNC: 101 MG/DL (ref 70–99)
GLUCOSE SERPL-MCNC: 76 MG/DL (ref 65–99)
HCT VFR BLD AUTO: 37.8 % (ref 34–46.6)
HGB BLD-MCNC: 12 G/DL (ref 12–15.9)
HOWELL-JOLLY BOD BLD QL SMEAR: PRESENT
LARGE PLATELETS: ABNORMAL
LYMPHOCYTES # BLD MANUAL: 6.04 10*3/MM3 (ref 0.7–3.1)
MCH RBC QN AUTO: 29.7 PG (ref 26.6–33)
MCHC RBC AUTO-ENTMCNC: 31.7 G/DL (ref 31.5–35.7)
MCV RBC AUTO: 93.6 FL (ref 79–97)
NEUTROPHILS # BLD AUTO: 4.16 10*3/MM3 (ref 1.7–7)
NEUTROPHILS NFR BLD MANUAL: 40 % (ref 42.7–76)
PATHOLOGY REVIEW: YES
PLATELET # BLD AUTO: 261 10*3/MM3 (ref 140–450)
PMV BLD AUTO: 9.1 FL (ref 6–12)
POIKILOCYTOSIS BLD QL SMEAR: ABNORMAL
POTASSIUM SERPL-SCNC: 3.6 MMOL/L (ref 3.5–5.2)
RBC # BLD AUTO: 4.04 10*6/MM3 (ref 3.77–5.28)
SCAN SLIDE: NORMAL
SMALL PLATELETS BLD QL SMEAR: ADEQUATE
SMUDGE CELLS BLD QL SMEAR: ABNORMAL
SODIUM SERPL-SCNC: 138 MMOL/L (ref 136–145)
VARIANT LYMPHS NFR BLD MANUAL: 58 % (ref 19.6–45.3)
VIT B12 BLD-MCNC: 264 PG/ML (ref 211–946)
WBC NRBC COR # BLD AUTO: 10.41 10*3/MM3 (ref 3.4–10.8)

## 2024-05-08 PROCEDURE — 25010000002 CEFTRIAXONE PER 250 MG: Performed by: STUDENT IN AN ORGANIZED HEALTH CARE EDUCATION/TRAINING PROGRAM

## 2024-05-08 PROCEDURE — 85652 RBC SED RATE AUTOMATED: CPT | Performed by: STUDENT IN AN ORGANIZED HEALTH CARE EDUCATION/TRAINING PROGRAM

## 2024-05-08 PROCEDURE — 85025 COMPLETE CBC W/AUTO DIFF WBC: CPT | Performed by: STUDENT IN AN ORGANIZED HEALTH CARE EDUCATION/TRAINING PROGRAM

## 2024-05-08 PROCEDURE — 80048 BASIC METABOLIC PNL TOTAL CA: CPT | Performed by: STUDENT IN AN ORGANIZED HEALTH CARE EDUCATION/TRAINING PROGRAM

## 2024-05-08 PROCEDURE — 85007 BL SMEAR W/DIFF WBC COUNT: CPT | Performed by: STUDENT IN AN ORGANIZED HEALTH CARE EDUCATION/TRAINING PROGRAM

## 2024-05-08 PROCEDURE — 82607 VITAMIN B-12: CPT | Performed by: STUDENT IN AN ORGANIZED HEALTH CARE EDUCATION/TRAINING PROGRAM

## 2024-05-08 PROCEDURE — 99232 SBSQ HOSP IP/OBS MODERATE 35: CPT | Performed by: STUDENT IN AN ORGANIZED HEALTH CARE EDUCATION/TRAINING PROGRAM

## 2024-05-08 PROCEDURE — 25010000002 LORAZEPAM PER 2 MG: Performed by: PHYSICIAN ASSISTANT

## 2024-05-08 PROCEDURE — 95819 EEG AWAKE AND ASLEEP: CPT

## 2024-05-08 RX ADMIN — CEFTRIAXONE SODIUM 1000 MG: 1 INJECTION, POWDER, FOR SOLUTION INTRAMUSCULAR; INTRAVENOUS at 19:17

## 2024-05-08 RX ADMIN — GABAPENTIN 300 MG: 300 CAPSULE ORAL at 22:25

## 2024-05-08 RX ADMIN — QUETIAPINE FUMARATE 300 MG: 200 TABLET ORAL at 09:42

## 2024-05-08 RX ADMIN — LORAZEPAM 2 MG: 2 INJECTION INTRAMUSCULAR; INTRAVENOUS at 09:42

## 2024-05-08 RX ADMIN — Medication 10 ML: at 09:58

## 2024-05-08 RX ADMIN — GABAPENTIN 300 MG: 300 CAPSULE ORAL at 09:42

## 2024-05-08 RX ADMIN — ACETAMINOPHEN 650 MG: 325 TABLET ORAL at 10:26

## 2024-05-08 RX ADMIN — Medication 10 ML: at 22:29

## 2024-05-08 RX ADMIN — LORAZEPAM 2 MG: 2 INJECTION INTRAMUSCULAR; INTRAVENOUS at 19:17

## 2024-05-08 RX ADMIN — CARVEDILOL 12.5 MG: 12.5 TABLET, FILM COATED ORAL at 09:42

## 2024-05-08 NOTE — SIGNIFICANT NOTE
"   05/08/24 1028   Behavior WDL   Behavior WDL motor movement   Motor Movement restless   Mental Health   Little Interest or Pleasure in Doing Things 3-->nearly every day   Feeling Down, Depressed or Hopeless 3-->nearly every day   Speech WDL   Speech whisper;soft/quiet;slow speech pattern   Thought Process WDL   Thought Process WDL thought process   Intellectual Performance WDL   Level of Consciousness Confusion   Stress   Do you feel stress - tense, restless, nervous, or anxious, or unable to sleep at night because your mind is troubled all the time - these days? Rather much   Coping/Stress   Major Change/Loss/Stressor chemical dependency/abuse;illness   Sources of Support none   Techniques to [place] with Loss/Stress/Change substance use   Developmental Stage (Eriksson's)   Developmental Stage Stage 7 (35-65 years/Middle Adulthood) Generativity vs. Stagnation   C-SSRS (Recent)   Q1 Wished to be Dead (Past Month) yes   Q2 Suicidal Thoughts (Past Month) yes   Within the past 3 Months? yes   Violence Risk   Feels Like Hurting Others no   Previous Attempt to Harm Others no       SW met with patient at the bedside to complete psychosocial assessment. Patient originally admitted to [facility], due to concerns for suicidal ideations and thoughts of self harm. Patient also with a history of substance abuse. Patient very difficult to engage in conversation, and difficult to understand. Patient talks very quietly. Patient does not open her eyes during assessment. Patient is restless, and moves continuously in bed. Patient stating she lives at home by herself with her dog. Patient does state she is uncertain if she still has a home to return to. SW asked patient about her support system. Patient reports having a daughter and a son, but does not elaborate on how involved they are. Does not appear the are involved, as she has a friend listed as her emergency contact. SW asked if we could contact her friend she states \" I don't " "care.\" Asked patient if she is currently having any suicidal thoughts, or thoughts of harming herself. Patient shares she is having thoughts of harm, and that she sometimes has suicidal thoughts. She does not elaborate on this. Patient stating she came to the hospital, because she needs and help, and needs help with her anxiety. Asked patient if she has a HX with anxiety and depression, she reports \" yes.\" She reports currently feeling depressed. Asked patient if she had and HX or current substance abuse. Patient stating she does not drink, and states \" Crank.\" When asked about substance abuse. She does not discuss this anymore with SW. During assessment SW had to listen very closely to understand her. Psych is currently following patient. Suspect that she will likely need to return to [facility] when she is medically stable. If she does not return to [facility], will discuss substance abuse treatment with her. SW will continue to follow at this time.     Latha SALMONW, CSW   "

## 2024-05-08 NOTE — PROGRESS NOTES
[facility]     Psychiatric Progress Note    Patient Name: Anjali Medina  : 1965  MRN: [id number]  Primary Care Physician:  Taina Cheema APRN  Date of admission: 2024    Subjective   Subjective     Patient seen and chart reviewed, discussed with staff.    Chief Complaint: Depression, mental status changes      HPI:     Patient today is difficult to arouse.  She is lying in bed very sleepy.  Patient received lorazepam this morning and yesterday afternoon.  Patient attempted to talk and engage in interview but she speaks in a low tone and is very difficult to understand.    Had an EEG today and results are pending.    She needs to get irritable and agitated.  Patient very unsteady on her feet attempting to get up out of bed.  Continues to have bizarre behavior      Objective   Objective     Vitals:   Temp:  [97.2 °F (36.2 °C)-98 °F (36.7 °C)] 97.2 °F (36.2 °C)  Heart Rate:  [69-89] 69  Resp:  [16-20] 16  BP: (111-147)/(72-95) 116/75          Mental Status Exam:      Appearance:   Lying in bed sleeping  Reliability:   Poor  Eye Contact:   Limited  Concentration/Focus:    Inattentive, difficult to engage, minimally responsive  Behaviors:    Either sleeping and difficult to arouse or restless and agitated  Memory :    Unable to assess  Speech:    Mumbles, poor direction, difficult to understand  Language:   No cursing  Mood :    Dysthymic  Affect:    Flat  Thought process:    Guarded, tangential  Thought Content:    No voice suicidal or homicidal ideation, does not appear to be hallucinating  Insight:   Limited  Judgement:    Impaired      Result Review    Result Review:  I have personally reviewed the results from the time of this admission to 2024 17:49 EDT and agree with these findings:  []  Laboratory  []  Microbiology  []  Radiology  []  EKG/Telemetry   []  Cardiology/Vascular   []  Pathology  []  Old records  []  Other:  Most notable findings include:     Lab Results (last 24  hours)       Procedure Component Value Units Date/Time    Vitamin B12 [[id number]]  (Normal) Collected: 05/08/24 0500    Specimen: Blood from Arm, Left Updated: 05/08/24 1028     Vitamin B-12 264 pg/mL     Narrative:      Results may be falsely increased if patient taking Biotin.      Pathology Consultation [[id number]] Collected: 05/08/24 0500    Specimen: Blood from Arm, Left Updated: 05/08/24 0832    Manual Differential [[id number]]  (Abnormal) Collected: 05/08/24 0500    Specimen: Blood from Arm, Left Updated: 05/08/24 0736     Neutrophil % 40.0 %      Lymphocyte % 58.0 %      Eosinophil % 2.0 %      Neutrophils Absolute 4.16 10*3/mm3      Lymphocytes Absolute 6.04 10*3/mm3      Eosinophils Absolute 0.21 10*3/mm3      Anisocytosis Slight/1+     Dillard-Kayenta Bodies Present     Poikilocytes Slight/1+     Smudge Cells Mod/2+     Platelet Estimate Adequate     Large Platelets Slight/1+     Giant Platelets Slight/1+    Basic Metabolic Panel [[id number]]  (Abnormal) Collected: 05/08/24 0500    Specimen: Blood from Arm, Left Updated: 05/08/24 0659     Glucose 76 mg/dL      BUN 12 mg/dL      Creatinine 0.57 mg/dL      Sodium 138 mmol/L      Potassium 3.6 mmol/L      Chloride 102 mmol/L      CO2 19.8 mmol/L      Calcium 8.5 mg/dL      BUN/Creatinine Ratio 21.1     Anion Gap 16.2 mmol/L      eGFR 105.5 mL/min/1.73     Narrative:      GFR Normal >60  Chronic Kidney Disease <60  Kidney Failure <15      CBC & Differential [[id number]] Collected: 05/08/24 0500    Specimen: Blood from Arm, Left Updated: 05/08/24 0552    Narrative:      The following orders were created for panel order CBC & Differential.  Procedure                               Abnormality         Status                     ---------                               -----------         ------                     CBC Auto Differential[[id number]]        Normal              Final result               Scan Slide[[id number]]                                       Final  result                 Please view results for these tests on the individual orders.    CBC Auto Differential [[id number]]  (Normal) Collected: 05/08/24 0500    Specimen: Blood from Arm, Left Updated: 05/08/24 0552     WBC 10.41 10*3/mm3      RBC 4.04 10*6/mm3      Hemoglobin 12.0 g/dL      Hematocrit 37.8 %      MCV 93.6 fL      MCH 29.7 pg      MCHC 31.7 g/dL      RDW 13.3 %      RDW-SD 45.0 fl      MPV 9.1 fL      Platelets 261 10*3/mm3     Scan Slide [[id number]] Collected: 05/08/24 0500    Specimen: Blood from Arm, Left Updated: 05/08/24 0552     Scan Slide --     Comment: See Manual Differential Results       Path Consult Reflex [[id number]] Collected: 05/08/24 0500    Specimen: Blood from Arm, Left Updated: 05/08/24 0552     Pathology Review Yes    Sedimentation Rate [[id number]]  (Abnormal) Collected: 05/08/24 0500    Specimen: Blood from Arm, Left Updated: 05/08/24 0549     Sed Rate 33 mm/hr     T4, Free [[id number]]  (Normal) Collected: 05/07/24 0455    Specimen: Blood from Arm, Left Updated: 05/07/24 1829     Free T4 1.46 ng/dL                 Medications:   carvedilol, 12.5 mg, Oral, BID With Meals  cefTRIAXone, 1,000 mg, Intravenous, Q24H  gabapentin, 300 mg, Oral, Q12H  sodium chloride, 10 mL, Intravenous, Q12H          Assessment / Plan       Active Hospital Problems:  Active Hospital Problems    Diagnosis     **Dehydration        Plan:     Given patient's level of sedation and unsteadiness may be overmedicated and we will discontinue quetiapine  Awaiting results of EEG  Continue to evaluate and make medication changes indicated  Patient will need to be more mobile without being such a high fall risk for being able to [facility] to [facility]      Disposition:  .    Part of this note may be an electronic transcription/translation of spoken language to printed text using the Dragon dictation system.         Electronically signed by Aaron Matias MD, 05/08/24, 5:49 PM EDT.

## 2024-05-08 NOTE — PLAN OF CARE
Goal Outcome Evaluation:  Plan of Care Reviewed With: patient              No signs of acute distress present. Sitter currently at bedside. VSS

## 2024-05-08 NOTE — PLAN OF CARE
Goal Outcome Evaluation:   Agitated this morning, see MAR, patient ambulated with two helpers to bathroom, very unsteady, complained of headache see MAR. Slept most of shift refused to eat or drink even when offered. Continue plan of care.

## 2024-05-08 NOTE — PROGRESS NOTES
[facility]   Hospitalist Progress Note  Date: 2024  Patient Name: Anjali Medina  : 1965  MRN: [id number]  Date of admission: 2024  Room/Bed: [facility]/1      Subjective   Subjective     Chief Complaint: High blood pressure, tachycardia    Summary:Anjali Medina is a 58 y.o. female with hypertension, chronic benzodiazepine use, history of cocaine/methamphetamine use, depression who was admitted to psychiatric unit in setting of worsening depression, suicidal ideations and recent self-harm by cutting her wrist. Per reports she had endorsed auditory hallucinations. UDS was positive for benzodiazepines and cocaine. She is prescribed Valium 5 mg every 8 hours as needed. Ethanol, Tylenol and aspirin level negative. White count 11.6 otherwise unremarkable. Potassium 3.4 otherwise CMP unremarkable. Per psychiatry , plan was to wean her off benzodiazepines slowly. She was restarted on home amitriptyline and Abilify was added. Had RRT called due to tachycardia and hypertension. Had been refusing to eat and drink and was dehydrated. Transfer to medical floor for intervention     Interval Followup: No acute overnight events per nursing.  Received 1 dose of Ativan this morning.  Mental status is a little improved, she is able to open her eyes, tries to respond to questioning but is very soft and hard to understand.  Not able to follow a lot of commands.    Review of Systems    Not obtained due to mental status    Objective   Objective     Vitals:   Temp:  [97.5 °F (36.4 °C)-98.4 °F (36.9 °C)] 97.5 °F (36.4 °C)  Heart Rate:  [77-89] 86  Resp:  [16-20] 16  BP: (111-147)/(72-95) 119/84    Physical Exam   Gen: NAD, awake  Cards: RRR, no murmur   Pulm: CTA b/l, no wheezing  Abd: soft, nondistended, nontender  Extremities: no pitting edema    Result Review    Result Review:  I have personally reviewed these results:  [x]  Laboratory      Lab 24  0500 24  0455 24  0502 24  0448 24  1700    WBC 10.41 13.17* 13.57* 15.24* 11.61*   HEMOGLOBIN 12.0 11.8* 11.9* 13.2 13.7   HEMATOCRIT 37.8 36.8 38.4 43.9 42.2   PLATELETS 261 271 287 348 411   NEUTROS ABS 4.16  --   --   --  5.84   IMMATURE GRANS (ABS)  --   --   --   --  0.02   LYMPHS ABS  --   --   --   --  5.22*   MONOS ABS  --   --   --   --  0.37   EOS ABS 0.21  --   --   --  0.07   MCV 93.6 93.4 95.3 98.4* 91.5   SED RATE 33*  --   --   --   --    CRP  --  1.49*  --   --   --    PROCALCITONIN  --   --   --  0.15  --          Lab 05/08/24  0500 05/07/24  0455 05/06/24  0502 05/05/24  0448 05/03/24  1748   SODIUM 138 140 144 147* 142   POTASSIUM 3.6 3.2* 3.5 3.4* 3.4*   CHLORIDE 102 105 109* 110* 105   CO2 19.8* 21.5* 24.5 19.8* 22.9   ANION GAP 16.2* 13.5 10.5 17.2* 14.1   BUN 12 14 27* 28* 16   CREATININE 0.57 0.54* 0.74 1.20* 0.79   EGFR 105.5 106.9 93.9 52.6* 86.8   GLUCOSE 76 85 90 125* 113*   CALCIUM 8.5* 8.4* 8.5* 9.1 8.6   MAGNESIUM  --  1.7  --  2.2 1.8   TSH  --  4.330*  --   --   --          Lab 05/03/24  1748   TOTAL PROTEIN 7.0   ALBUMIN 3.9   GLOBULIN 3.1   ALT (SGPT) 12   AST (SGOT) 15   BILIRUBIN 0.3   ALK PHOS 135*                 Lab 05/08/24  0500   VITAMIN B 12 264         Brief Urine Lab Results  (Last result in the past 365 days)        Color   Clarity   Blood   Leuk Est   Nitrite   Protein   CREAT   Urine HCG        05/05/24 1659 Yellow   Cloudy   Small (1+)   Trace   Negative   30 mg/dL (1+)                 [x]  Microbiology   Microbiology Results (last 10 days)       Procedure Component Value - Date/Time    Urine Culture - Urine, Straight Cath [[id number]]  (Abnormal)  (Susceptibility) Collected: 05/05/24 1659    Lab Status: Final result Specimen: Urine from Straight Cath Updated: 05/07/24 1007     Urine Culture >100,000 CFU/mL Escherichia coli    Narrative:      Colonization of the urinary tract without infection is common. Treatment is discouraged unless the patient is symptomatic, pregnant, or undergoing an invasive urologic  procedure.    Susceptibility        Escherichia coli      ANTONINA      Amoxicillin + Clavulanate Intermediate      Ampicillin Resistant      Ampicillin + Sulbactam Resistant      Cefazolin Susceptible      Cefepime Susceptible      Ceftazidime Susceptible      Ceftriaxone Susceptible      Gentamicin Susceptible      Levofloxacin Resistant      Nitrofurantoin Susceptible      Piperacillin + Tazobactam Susceptible      Trimethoprim + Sulfamethoxazole Resistant                                 [x]  Radiology  CT Head Without Contrast    Result Date: 5/7/2024  Impression: No acute intracranial abnormality.    Electronically Signed By-Mak Moran MD On:5/7/2024 10:18 PM      XR Chest 1 View    Result Date: 5/5/2024  No active disease.   Electronically Signed By-PHILIP JOHNSON MD On:5/5/2024 6:02 PM     []  EKG/Telemetry   []  Cardiology/Vascular   []  Pathology  []  Old records  []  Other:    Assessment & Plan   Assessment / Plan     Assessment:  Altered mental status  Dehydration, resolved  Prerenal CHAR, resolved  Hypernatremia, resolved  Sinus tachycardia, resolved  Hypertension  Urinary retention  Acute cystitis secondary to E. coli  Leukocytosis  Hypokalemia  Chronic benzodiazepine abuse  Stimulant abuse  Neuropathy on gabapentin  Recurrent MDD with suicidal ideation      Plan:  Continue inpatient admission  Sodium remains normal today  Potassium improved  Leukocytosis resolved  Bladder scans remain negative for significant retention.  Urine culture positive for E. coli.  Continue IV Rocephin for 3 days.  Continue Coreg 12.5 mg twice daily.  Blood pressures improved.  Psych following.  Continue Seroquel.  Continue one-to-one sitter.  Plan to [facility] once medically stable.  CT head negative.  EEG pending.  T4 normal, B12 normal.    A.m. labs to monitor hemoglobin electrolytes      Disposition: If EEG is normal can likely go to [facility] tomorrow.     Discussed with RN.    DVT  prophylaxis:  Mechanical DVT prophylaxis orders are present.        CODE STATUS:   Code Status (Patient has no pulse and is not breathing): CPR (Attempt to Resuscitate)  Medical Interventions (Patient has pulse or is breathing): Full Support      Electronically signed by Janki Schneider DO, 5/8/2024, 14:59 EDT.

## 2024-05-09 ENCOUNTER — PREP FOR SURGERY (OUTPATIENT)
Dept: OTHER | Facility: HOSPITAL | Age: 59
End: 2024-05-09
Payer: COMMERCIAL

## 2024-05-09 ENCOUNTER — HOSPITAL ENCOUNTER (INPATIENT)
Facility: HOSPITAL | Age: 59
LOS: 4 days | Discharge: HOME | DRG: 881 | End: 2024-05-13
Attending: PSYCHIATRY & NEUROLOGY | Admitting: PSYCHIATRY & NEUROLOGY
Payer: COMMERCIAL

## 2024-05-09 VITALS
TEMPERATURE: 99.1 F | SYSTOLIC BLOOD PRESSURE: 157 MMHG | HEART RATE: 84 BPM | RESPIRATION RATE: 16 BRPM | WEIGHT: 211.2 LBS | HEIGHT: 66 IN | OXYGEN SATURATION: 97 % | DIASTOLIC BLOOD PRESSURE: 114 MMHG | BODY MASS INDEX: 33.94 KG/M2

## 2024-05-09 DIAGNOSIS — F29 PSYCHOSIS: Primary | ICD-10-CM

## 2024-05-09 DIAGNOSIS — F29 PSYCHOSIS: ICD-10-CM

## 2024-05-09 LAB
CYTO UR: NORMAL
LAB AP CASE REPORT: NORMAL
LAB AP CLINICAL INFORMATION: NORMAL
PATH REPORT.FINAL DX SPEC: NORMAL
PATH REPORT.GROSS SPEC: NORMAL

## 2024-05-09 PROCEDURE — 25010000002 LORAZEPAM PER 2 MG: Performed by: PHYSICIAN ASSISTANT

## 2024-05-09 PROCEDURE — 25010000002 SODIUM CHLORIDE 0.9 % WITH KCL 20 MEQ 20-0.9 MEQ/L-% SOLUTION: Performed by: INTERNAL MEDICINE

## 2024-05-09 PROCEDURE — 25010000002 HALOPERIDOL LACTATE PER 5 MG: Performed by: PHYSICIAN ASSISTANT

## 2024-05-09 PROCEDURE — 99239 HOSP IP/OBS DSCHRG MGMT >30: CPT | Performed by: STUDENT IN AN ORGANIZED HEALTH CARE EDUCATION/TRAINING PROGRAM

## 2024-05-09 RX ORDER — CARVEDILOL 12.5 MG/1
12.5 TABLET ORAL 2 TIMES DAILY WITH MEALS
Status: CANCELLED | OUTPATIENT
Start: 2024-05-09

## 2024-05-09 RX ORDER — HYDROXYZINE PAMOATE 50 MG/1
50 CAPSULE ORAL EVERY 6 HOURS PRN
Status: DISCONTINUED | OUTPATIENT
Start: 2024-05-09 | End: 2024-05-13 | Stop reason: HOSPADM

## 2024-05-09 RX ORDER — LORAZEPAM 2 MG/ML
1 INJECTION INTRAMUSCULAR EVERY 6 HOURS PRN
Status: DISCONTINUED | OUTPATIENT
Start: 2024-05-09 | End: 2024-05-09

## 2024-05-09 RX ORDER — DIPHENHYDRAMINE HYDROCHLORIDE 50 MG/ML
50 INJECTION INTRAMUSCULAR; INTRAVENOUS EVERY 4 HOURS PRN
Status: CANCELLED | OUTPATIENT
Start: 2024-05-09

## 2024-05-09 RX ORDER — ACETAMINOPHEN 325 MG/1
650 TABLET ORAL EVERY 4 HOURS PRN
Status: DISCONTINUED | OUTPATIENT
Start: 2024-05-09 | End: 2024-05-13 | Stop reason: HOSPADM

## 2024-05-09 RX ORDER — HALOPERIDOL 5 MG/ML
5 INJECTION INTRAMUSCULAR EVERY 4 HOURS PRN
Status: DISCONTINUED | OUTPATIENT
Start: 2024-05-09 | End: 2024-05-13 | Stop reason: HOSPADM

## 2024-05-09 RX ORDER — ALUMINA, MAGNESIA, AND SIMETHICONE 2400; 2400; 240 MG/30ML; MG/30ML; MG/30ML
15 SUSPENSION ORAL EVERY 6 HOURS PRN
Status: CANCELLED | OUTPATIENT
Start: 2024-05-09

## 2024-05-09 RX ORDER — HALOPERIDOL 5 MG/1
5 TABLET ORAL EVERY 4 HOURS PRN
Status: CANCELLED | OUTPATIENT
Start: 2024-05-09

## 2024-05-09 RX ORDER — RISPERIDONE 2 MG/1
2 TABLET ORAL 2 TIMES DAILY
Status: DISCONTINUED | OUTPATIENT
Start: 2024-05-09 | End: 2024-05-13 | Stop reason: HOSPADM

## 2024-05-09 RX ORDER — NICOTINE 21 MG/24HR
1 PATCH, TRANSDERMAL 24 HOURS TRANSDERMAL DAILY PRN
Status: DISCONTINUED | OUTPATIENT
Start: 2024-05-09 | End: 2024-05-13 | Stop reason: HOSPADM

## 2024-05-09 RX ORDER — ACETAMINOPHEN 325 MG/1
650 TABLET ORAL EVERY 4 HOURS PRN
Status: CANCELLED | OUTPATIENT
Start: 2024-05-09

## 2024-05-09 RX ORDER — DIPHENHYDRAMINE HYDROCHLORIDE 50 MG/ML
50 INJECTION INTRAMUSCULAR; INTRAVENOUS EVERY 4 HOURS PRN
Status: DISCONTINUED | OUTPATIENT
Start: 2024-05-09 | End: 2024-05-13 | Stop reason: HOSPADM

## 2024-05-09 RX ORDER — RISPERIDONE 2 MG/1
2 TABLET ORAL 2 TIMES DAILY
Status: CANCELLED | OUTPATIENT
Start: 2024-05-09

## 2024-05-09 RX ORDER — HYDROXYZINE PAMOATE 50 MG/1
50 CAPSULE ORAL EVERY 6 HOURS PRN
Status: CANCELLED | OUTPATIENT
Start: 2024-05-09

## 2024-05-09 RX ORDER — LOPERAMIDE HYDROCHLORIDE 2 MG/1
2 CAPSULE ORAL
Status: CANCELLED | OUTPATIENT
Start: 2024-05-09

## 2024-05-09 RX ORDER — TRAZODONE HYDROCHLORIDE 100 MG/1
100 TABLET ORAL NIGHTLY PRN
Status: CANCELLED | OUTPATIENT
Start: 2024-05-09

## 2024-05-09 RX ORDER — HYDROXYZINE PAMOATE 25 MG/1
50 CAPSULE ORAL EVERY 4 HOURS PRN
Status: DISCONTINUED | OUTPATIENT
Start: 2024-05-09 | End: 2024-05-09 | Stop reason: HOSPADM

## 2024-05-09 RX ORDER — NICOTINE 21 MG/24HR
1 PATCH, TRANSDERMAL 24 HOURS TRANSDERMAL DAILY PRN
Status: CANCELLED | OUTPATIENT
Start: 2024-05-09

## 2024-05-09 RX ORDER — ALUMINA, MAGNESIA, AND SIMETHICONE 2400; 2400; 240 MG/30ML; MG/30ML; MG/30ML
15 SUSPENSION ORAL EVERY 6 HOURS PRN
Status: DISCONTINUED | OUTPATIENT
Start: 2024-05-09 | End: 2024-05-13 | Stop reason: HOSPADM

## 2024-05-09 RX ORDER — TRAZODONE HYDROCHLORIDE 100 MG/1
100 TABLET ORAL NIGHTLY PRN
Status: DISCONTINUED | OUTPATIENT
Start: 2024-05-09 | End: 2024-05-11

## 2024-05-09 RX ORDER — HALOPERIDOL 5 MG/ML
1 INJECTION INTRAMUSCULAR ONCE
Status: COMPLETED | OUTPATIENT
Start: 2024-05-09 | End: 2024-05-09

## 2024-05-09 RX ORDER — HALOPERIDOL 5 MG/ML
5 INJECTION INTRAMUSCULAR EVERY 4 HOURS PRN
Status: CANCELLED | OUTPATIENT
Start: 2024-05-09

## 2024-05-09 RX ORDER — LOPERAMIDE HYDROCHLORIDE 2 MG/1
2 CAPSULE ORAL
Status: DISCONTINUED | OUTPATIENT
Start: 2024-05-09 | End: 2024-05-13 | Stop reason: HOSPADM

## 2024-05-09 RX ORDER — DIPHENHYDRAMINE HCL 50 MG
50 CAPSULE ORAL EVERY 4 HOURS PRN
Status: DISCONTINUED | OUTPATIENT
Start: 2024-05-09 | End: 2024-05-13 | Stop reason: HOSPADM

## 2024-05-09 RX ORDER — CARVEDILOL 3.12 MG/1
12.5 TABLET ORAL 2 TIMES DAILY WITH MEALS
Status: DISCONTINUED | OUTPATIENT
Start: 2024-05-09 | End: 2024-05-10

## 2024-05-09 RX ORDER — HALOPERIDOL 5 MG/1
5 TABLET ORAL EVERY 4 HOURS PRN
Status: DISCONTINUED | OUTPATIENT
Start: 2024-05-09 | End: 2024-05-13 | Stop reason: HOSPADM

## 2024-05-09 RX ORDER — DIPHENHYDRAMINE HCL 50 MG
50 CAPSULE ORAL EVERY 4 HOURS PRN
Status: CANCELLED | OUTPATIENT
Start: 2024-05-09

## 2024-05-09 RX ADMIN — ACETAMINOPHEN 650 MG: 325 TABLET ORAL at 11:18

## 2024-05-09 RX ADMIN — LORAZEPAM 2 MG: 2 INJECTION INTRAMUSCULAR; INTRAVENOUS at 00:42

## 2024-05-09 RX ADMIN — ACETAMINOPHEN 650 MG: 325 TABLET ORAL at 19:36

## 2024-05-09 RX ADMIN — GABAPENTIN 300 MG: 300 CAPSULE ORAL at 11:18

## 2024-05-09 RX ADMIN — CARVEDILOL 12.5 MG: 3.12 TABLET, FILM COATED ORAL at 17:29

## 2024-05-09 RX ADMIN — HALOPERIDOL LACTATE 1 MG: 5 INJECTION, SOLUTION INTRAMUSCULAR at 05:16

## 2024-05-09 RX ADMIN — POTASSIUM CHLORIDE AND SODIUM CHLORIDE 100 ML/HR: 900; 150 INJECTION, SOLUTION INTRAVENOUS at 03:23

## 2024-05-09 RX ADMIN — ACETAMINOPHEN 650 MG: 325 TABLET ORAL at 03:23

## 2024-05-09 RX ADMIN — RISPERIDONE 2 MG: 2 TABLET, FILM COATED ORAL at 21:05

## 2024-05-09 RX ADMIN — CARVEDILOL 12.5 MG: 12.5 TABLET, FILM COATED ORAL at 11:18

## 2024-05-09 RX ADMIN — LORAZEPAM 2 MG: 2 INJECTION INTRAMUSCULAR; INTRAVENOUS at 04:20

## 2024-05-09 NOTE — NURSING NOTE
Patient sitting up at bedside talking more clearly and more alert. Was able to converse presley than previously. No other problems reported or noted. MD cleared medically. Report given to  nurse. Transferred with belongings, security and sitter.

## 2024-05-09 NOTE — PROGRESS NOTES
" [facility]     Psychiatric Progress Note    Patient Name: Anjali Medina  : 1965  MRN: [id number]  Primary Care Physician:  Taina Cheema APRN  Date of admission: 2024    Subjective   Subjective     Patient seen and chart reviewed, discussed with staff.    Chief Complaint: Depression, substance use, altered mental status      HPI:     Patient is more awake and alert today.  Patient continues to be unsteady, but improved.  She is more awake and alert today.    Patient today is tearful and labile in her mood.  Has an unstable mood.  Cannot tell me exactly why she is here in the hospital.  Patient states she has she needs to make something better of her life.  She reports that she has had suicidal thoughts but is difficult to ascertain if she is having those thoughts at this time.  She is somewhat inattentive to the interview.    Dates that she has been using \"dope\" at home including methamphetamine, crack cocaine, and other substances and that she needs help getting her life back on track.  States that she would go to rehab.    Quetiapine has been discontinued and she seems more alert.  Staff reports that lorazepam seems to make her worse.  She has received 2 doses of lorazepam since midnight      Objective   Objective     Vitals:   Temp:  [97.2 °F (36.2 °C)-98.7 °F (37.1 °C)] 98.7 °F (37.1 °C)  Heart Rate:  [69-90] 90  Resp:  [16-20] 18  BP: (115-145)/(75-97) 145/90          Mental Status Exam:      Appearance:   Disheveled, unkempt, difficult to engage  Reliability:   Fair  Eye Contact:   Limited  Concentration/Focus:    Attentive to the interview but easily distracted  Behaviors:    Tearful, some restlessness  Memory :    Intact  Speech:    Minimal, decreased tone, improved diction  Language:   Appropriate  Mood :    Dysthymic  Affect:    Tearful  Thought process:    Guarded, negative, disorganized, tangential  Thought Content:    Has some suicidal ideations, no homicidal ideations, " denies hallucinations and does not appear to be hallucinating but thoughts are disorganized  Insight:   Limited  Judgement:    Intact, no behavioral disturbance      Result Review    Result Review:  I have personally reviewed the results from the time of this admission to 5/9/2024 10:47 EDT and agree with these findings:  []  Laboratory  []  Microbiology  []  Radiology  []  EKG/Telemetry   []  Cardiology/Vascular   []  Pathology  []  Old records  []  Other:  Most notable findings include:     Lab Results (last 24 hours)       Procedure Component Value Units Date/Time    POC Glucose Once [[id number]]  (Abnormal) Collected: 05/07/24 0909    Specimen: Blood Updated: 05/08/24 2325     Glucose 101 mg/dL      Comment: Serial Number: [id number]:  [id number]                   Medications:   carvedilol, 12.5 mg, Oral, BID With Meals  cefTRIAXone, 1,000 mg, Intravenous, Q24H  gabapentin, 300 mg, Oral, Q12H  sodium chloride, 10 mL, Intravenous, Q12H          Assessment / Plan       Active Hospital Problems:  Active Hospital Problems    Diagnosis     **Dehydration        Plan:     Discontinue lorazepam  Add Vistaril for anxiety  We will discuss case with Dr. Schneider and have nothing medically going on will transfer back to [facility] for further management referral to drug and alcohol rehabilitation      Disposition:        Part of this note may be an electronic transcription/translation of spoken language to printed text using the Dragon dictation system.         Electronically signed by Aaron Matias MD, 05/09/24, 10:47 AM EDT.

## 2024-05-09 NOTE — NURSING NOTE
"Pt arrived to [facility] unit at approx 1645, escorted by security x 3 and CWA.  Pt was calm and cooperative with search, orientation to unit and initial assessment.  Pt denies SI or HI.  Pt denies a/v/h.  Pt reports and anxiety and depression 10/10.  Pt reports that her goal for tx is, \"to be healed.\" Pt states that she wants nothing to do with drugs in the future and would like to go to rehab.  Pt is tearful and labile during assessment.  She states that she doesn't know if her friends and family want anything to do with her now and she is unsure who is caring for her dog.  Pt reports that she is also worried if her disability is active.  Pt was given 12.5mg of Coreg as scheduled.  No s/s of acute distress observed at this time.  SW remains at bedside.   "

## 2024-05-09 NOTE — PLAN OF CARE
Goal Outcome Evaluation:  Pt was about to answer name and birthday, sometimes able to say where she was but didn't know why she was here. Pt had spells of combativeness and continued attempts to pull out IV, was given iv ativan, pt continued to attempt to pull at lines and complained of pain in stomach.  Bladder scanned and only had 185 in bladder at the time. MD ordered dose of haldol, pt was able to calm down and emptied bladder. Vss.

## 2024-05-09 NOTE — DISCHARGE SUMMARY
[facility]         HOSPITALIST  DISCHARGE SUMMARY    Patient Name: Anjali Medina  : 1965  MRN: [id number]    Date of Admission: 2024  Date of Discharge:  2024    Primary Care Physician: Taina Cheema APRN    Consults       Date and Time Order Name Status Description    2024  1:52 PM Inpatient Psychiatrist Consult              Active and Resolved Hospital Problems:  Active Hospital Problems    Diagnosis POA   • **Dehydration [E86.0] Yes      Resolved Hospital Problems   No resolved problems to display.       Hospital Course     Hospital Course:  Anjali Medina is a 58 y.o. female  with hypertension, chronic benzodiazepine use, history of cocaine/methamphetamine use, depression who was admitted to psychiatric unit in setting of worsening depression, suicidal ideations and recent self-harm by cutting her wrist. UDS was positive for benzodiazepines and cocaine. She is prescribed Valium 5 mg every 8 hours as needed. Per psychiatry , plan was to wean her off benzodiazepines slowly. She was restarted on home amitriptyline and Abilify was added. Had RRT called due to tachycardia and hypertension. Had been refusing to eat and drink and was dehydrated. Transfer to medical floor for intervention.  Urinalysis with possible acute cystitis, urine culture was positive for E. coli.  She completed 3 days IV Rocephin.  She had significant somnolence that was thought to be medication induced as she was requiring Ativan for agitation.  CT head, EEG, and lab workup was negative.  Mental status improved and she was alert and oriented.  Transferred back to [facility] for further psych care.    Patient discharged in stable condition.    DISCHARGE Follow Up Recommendations for labs and diagnostics: Follow-up with PCP in 1 week.  Follow-up with psych as instructed.      Day of Discharge     Vital Signs:  Temp:  [97.2 °F (36.2 °C)-98.7 °F (37.1 °C)] 98.7 °F (37.1 °C)  Heart Rate:   [69-90] 90  Resp:  [16-20] 18  BP: (115-145)/(75-97) 145/90    Physical Exam:   Gen: NAD, Alert and Oriented  Cards: RRR, no murmur   Pulm: CTA b/l, no wheezing  Abd: soft, nondistended  Extremities: no pitting edema      Discharge Details        Discharge Medications        Continue These Medications        Instructions Start Date   carvedilol 6.25 MG tablet  Commonly known as: Coreg   6.25 mg, Oral, 2 Times Daily With Meals      famotidine 20 MG tablet  Commonly known as: PEPCID   20 mg, Oral, 2 Times Daily      gabapentin 300 MG capsule  Commonly known as: NEURONTIN   Take 1 capsule by mouth 3 (Three) Times a Day. Indications: Neuropathic Pain             Stop These Medications      ARIPiprazole 10 MG tablet  Commonly known as: ABILIFY     diclofenac 75 MG EC tablet  Commonly known as: VOLTAREN              No Known Allergies    Discharge Disposition:  Psychiatric Hospital or Unit (DC - External or [facility])    Diet:  Hospital:  Diet Order   Procedures   • Diet: Regular/House; Fluid Consistency: Thin (IDDSI 0)       Discharge Activity:       CODE STATUS:  Code Status and Medical Interventions:   Ordered at: 05/04/24 1851     Code Status (Patient has no pulse and is not breathing):    CPR (Attempt to Resuscitate)     Medical Interventions (Patient has pulse or is breathing):    Full Support         Future Appointments   Date Time Provider Department Center   5/20/2024  2:45 PM RIK BLAYNE MRI 1 [facility] ETWMR [facility]   5/28/2024 10:30 AM Bradley Adler MD [facility] [place] [facility]   8/21/2024 11:15 AM Stephanie Fletcher APRN [facility] [place]I [facility]WN [facility]       Additional Instructions for the Follow-ups that You Need to Schedule       Discharge Follow-up with PCP   As directed       Currently Documented PCP:    Taina Cheema APRN    PCP Phone Number:    [phone]     Follow Up Details: one week                Pertinent  and/or Most Recent Results         LAB RESULTS:      Lab 05/08/24  0500 05/07/24  0455 05/06/24  0502 05/05/24  0448  05/03/24  1709   WBC 10.41 13.17* 13.57* 15.24* 11.61*   HEMOGLOBIN 12.0 11.8* 11.9* 13.2 13.7   HEMATOCRIT 37.8 36.8 38.4 43.9 42.2   PLATELETS 261 271 287 348 411   NEUTROS ABS 4.16  --   --   --  5.84   IMMATURE GRANS (ABS)  --   --   --   --  0.02   LYMPHS ABS  --   --   --   --  5.22*   MONOS ABS  --   --   --   --  0.37   EOS ABS 0.21  --   --   --  0.07   MCV 93.6 93.4 95.3 98.4* 91.5   SED RATE 33*  --   --   --   --    CRP  --  1.49*  --   --   --    PROCALCITONIN  --   --   --  0.15  --          Lab 05/08/24  0500 05/07/24  0455 05/06/24  0502 05/05/24  0448 05/03/24  1748   SODIUM 138 140 144 147* 142   POTASSIUM 3.6 3.2* 3.5 3.4* 3.4*   CHLORIDE 102 105 109* 110* 105   CO2 19.8* 21.5* 24.5 19.8* 22.9   ANION GAP 16.2* 13.5 10.5 17.2* 14.1   BUN 12 14 27* 28* 16   CREATININE 0.57 0.54* 0.74 1.20* 0.79   EGFR 105.5 106.9 93.9 52.6* 86.8   GLUCOSE 76 85 90 125* 113*   CALCIUM 8.5* 8.4* 8.5* 9.1 8.6   MAGNESIUM  --  1.7  --  2.2 1.8   TSH  --  4.330*  --   --   --          Lab 05/03/24  1748   TOTAL PROTEIN 7.0   ALBUMIN 3.9   GLOBULIN 3.1   ALT (SGPT) 12   AST (SGOT) 15   BILIRUBIN 0.3   ALK PHOS 135*                 Lab 05/08/24  0500   VITAMIN B 12 264         Brief Urine Lab Results  (Last result in the past 365 days)        Color   Clarity   Blood   Leuk Est   Nitrite   Protein   CREAT   Urine HCG        05/05/24 1659 Yellow   Cloudy   Small (1+)   Trace   Negative   30 mg/dL (1+)                 Microbiology Results (last 10 days)       Procedure Component Value - Date/Time    Urine Culture - Urine, Straight Cath [[id number]]  (Abnormal)  (Susceptibility) Collected: 05/05/24 1659    Lab Status: Final result Specimen: Urine from Straight Cath Updated: 05/07/24 1007     Urine Culture >100,000 CFU/mL Escherichia coli    Narrative:      Colonization of the urinary tract without infection is common. Treatment is discouraged unless the patient is symptomatic, pregnant, or undergoing an invasive urologic  procedure.    Susceptibility        Escherichia coli      ANTONINA      Amoxicillin + Clavulanate Intermediate      Ampicillin Resistant      Ampicillin + Sulbactam Resistant      Cefazolin Susceptible      Cefepime Susceptible      Ceftazidime Susceptible      Ceftriaxone Susceptible      Gentamicin Susceptible      Levofloxacin Resistant      Nitrofurantoin Susceptible      Piperacillin + Tazobactam Susceptible      Trimethoprim + Sulfamethoxazole Resistant                                   CT Head Without Contrast    Result Date: 5/7/2024  Impression: No acute intracranial abnormality.    Electronically Signed By-Mak Moran MD On:5/7/2024 10:18 PM      XR Chest 1 View    Result Date: 5/5/2024  No active disease.   Electronically Signed By-PHILIP JOHNSON MD On:5/5/2024 6:02 PM                    Labs Pending at Discharge:  Pending Labs       Order Current Status    Pathology Consultation In process              Time spent on Discharge including face to face service:  >30 minutes    Electronically signed by Janki Schneider DO, 05/09/24, 11:30 AM EDT.

## 2024-05-10 PROCEDURE — 99222 1ST HOSP IP/OBS MODERATE 55: CPT | Performed by: STUDENT IN AN ORGANIZED HEALTH CARE EDUCATION/TRAINING PROGRAM

## 2024-05-10 RX ORDER — NAPROXEN 250 MG/1
500 TABLET ORAL 2 TIMES DAILY WITH MEALS
Status: DISCONTINUED | OUTPATIENT
Start: 2024-05-10 | End: 2024-05-13 | Stop reason: HOSPADM

## 2024-05-10 RX ORDER — GABAPENTIN 300 MG/1
300 CAPSULE ORAL 3 TIMES DAILY
Status: DISCONTINUED | OUTPATIENT
Start: 2024-05-10 | End: 2024-05-13 | Stop reason: HOSPADM

## 2024-05-10 RX ORDER — DULOXETIN HYDROCHLORIDE 30 MG/1
30 CAPSULE, DELAYED RELEASE ORAL DAILY
Status: DISCONTINUED | OUTPATIENT
Start: 2024-05-10 | End: 2024-05-13 | Stop reason: HOSPADM

## 2024-05-10 RX ORDER — LOSARTAN POTASSIUM 25 MG/1
25 TABLET ORAL
Status: DISCONTINUED | OUTPATIENT
Start: 2024-05-10 | End: 2024-05-13 | Stop reason: HOSPADM

## 2024-05-10 RX ORDER — CARVEDILOL 3.12 MG/1
6.25 TABLET ORAL 2 TIMES DAILY WITH MEALS
Status: DISCONTINUED | OUTPATIENT
Start: 2024-05-10 | End: 2024-05-10

## 2024-05-10 RX ORDER — CARVEDILOL 3.12 MG/1
12.5 TABLET ORAL 2 TIMES DAILY WITH MEALS
Status: DISCONTINUED | OUTPATIENT
Start: 2024-05-10 | End: 2024-05-13 | Stop reason: HOSPADM

## 2024-05-10 RX ADMIN — CARVEDILOL 12.5 MG: 3.12 TABLET, FILM COATED ORAL at 17:40

## 2024-05-10 RX ADMIN — NAPROXEN 500 MG: 250 TABLET ORAL at 17:40

## 2024-05-10 RX ADMIN — RISPERIDONE 2 MG: 2 TABLET, FILM COATED ORAL at 20:39

## 2024-05-10 RX ADMIN — ACETAMINOPHEN 650 MG: 325 TABLET ORAL at 01:36

## 2024-05-10 RX ADMIN — CARVEDILOL 6.25 MG: 3.12 TABLET, FILM COATED ORAL at 08:29

## 2024-05-10 RX ADMIN — TRAZODONE HYDROCHLORIDE 100 MG: 100 TABLET ORAL at 21:56

## 2024-05-10 RX ADMIN — DULOXETINE HYDROCHLORIDE 30 MG: 30 CAPSULE, DELAYED RELEASE ORAL at 12:56

## 2024-05-10 RX ADMIN — GABAPENTIN 300 MG: 300 CAPSULE ORAL at 16:27

## 2024-05-10 RX ADMIN — LOSARTAN POTASSIUM 25 MG: 25 TABLET, FILM COATED ORAL at 12:58

## 2024-05-10 RX ADMIN — GABAPENTIN 300 MG: 300 CAPSULE ORAL at 20:39

## 2024-05-10 RX ADMIN — GABAPENTIN 300 MG: 300 CAPSULE ORAL at 08:29

## 2024-05-10 RX ADMIN — NAPROXEN 500 MG: 250 TABLET ORAL at 08:30

## 2024-05-10 RX ADMIN — RISPERIDONE 2 MG: 2 TABLET, FILM COATED ORAL at 08:30

## 2024-05-10 RX ADMIN — ACETAMINOPHEN 650 MG: 325 TABLET ORAL at 12:09

## 2024-05-10 NOTE — PLAN OF CARE
Goal Outcome Evaluation:  Plan of Care Reviewed With: patient  Patient Agreement with Plan of Care: agrees                                    Pt has been withdrawn to room, resting for much of the day. She denies SI/HI, AVH and contracts for safety. She rates anxiety 8, depression 7. She reports ongoing pain in back, shoulders, and R wrist. She is able to make her needs known. Will continue to monitor and provide safe environment.

## 2024-05-10 NOTE — PLAN OF CARE
Goal Outcome Evaluation:  Plan of Care Reviewed With: patient  Patient Agreement with Plan of Care: agrees  Pt is being monitored with close watch sitter. Pt is oriented to month and year, not oriented to current place .Pt noted to require assistance while up due to being unsteady while ambulation.Pt c/o joint pain bilateral shoulders and general discomfort  and was given Tylenol 650 mg p.o at 1939. Pt noted to be restless and assisted to ambulate to the dayroom and to a recliner to help relax and decrease pain.Pt denies suicidal and homicidal ideations, denies hallucinations.Pt endorses feeling depressed and anxious and rates both a 10/10. Pt med compliant. Treatment plan reviewed,updated and ongoing.

## 2024-05-10 NOTE — H&P
"[facility] [place]IATRIC  HISTORY AND PHYSICAL    Patient Name: Anjali Medina  : 1965  MRN: [id number]  Primary Care Physician:  Taina Cheema, BHARAT  Date of admission: 2024    Subjective   Subjective     Chief Complaint: Depression, substance use    HPI:     Anjali Medina is a 58 y.o. female with a history of hypertension, chronic pain, depression, and substance abuse.  Patient was initially manage [facility] had to go to the medical side of the hospital was stabilized is now back to [facility].  She continues to be depressed.  Patient is denying suicidal ideations today.      Patient had a decline in her mental status and became somewhat obtunded, disorganized, more unsteady on her feet, and required stabilization on the medical unit.  She received antibiotics for UTI.  Other medical issues were addressed.  She continues to have high blood pressure, and has been on carvedilol.  Will have the medicine team follow and continue to work on blood pressure.    Patient today is more awake and alert than any other day of seeing her.  She is sitting up on side of the bed.  She is fully oriented.  She reports that she is feeling \"sort of better\" but is shown significant improvement.  She has lots of somatic complaints.  She reports her mood has been better.  She denies suicidal or homicidal ideation.    Patient knowledge this recent use to substance and it caused change in her mental status which led to an ambulance being called.  She was unsure if she had been using methamphetamine or crack cocaine and reports that she has used both of them frequently over the past at least 1 month and she states it is actually been a lot longer than that but cannot give a timeline.  She is positive for benzodiazepines and cocaine on admission.  Some of her mental status changes could have been detoxification from substances, possibly benzodiazepine withdrawal.    She had been unsteady on her feet and " "requiring a safety watch.  However she was able to stand take steps without assistance with me today.  Nursing staff continued to assess station and gait she was steady and able to ambulate under her own power without any difficulty safety watch discontinued.      Review of Systems:      CONSTITUTIONAL: Feels well denies any acute medical problems  PSYCHIATRIC: As documented in HPI    Personal History     Past Medical History:   Diagnosis Date   • Anxiety and depression    • Back pain        Past Surgical History:   Procedure Laterality Date   • HYSTERECTOMY     • SPINAL FUSION         Past Psychiatric History: Not have a current psychiatric provider.  Has been treated for depression and anxiety from neurology    Psychiatric Hospitalizations: Denied any previous psychiatric hospitalizations    Suicide Attempts: History of attempts    Prior Treatment and Medications Tried: Multiple medication trials.  Reports use of benzodiazepines extensively in the past.      Family History: family history is not on file. Otherwise pertinent FHx was reviewed and not pertinent to current issue.    Family Psych History:None known to patient      Family Substance Abuse History:None known to patient      Family Suicide History:None known to patient      Social History:     Social History     Socioeconomic History   • Marital status:    • Number of children: 2   Tobacco Use   • Smoking status: Never   • Smokeless tobacco: Never   Vaping Use   • Vaping status: Never Used   Substance and Sexual Activity   • Alcohol use: Not Currently   • Drug use: Yes     Types: \"Crack\" cocaine, Methamphetamines   • Sexual activity: Defer       Substance Abuse History: reports that she has never smoked. She has never used smokeless tobacco. She reports that she does not currently use alcohol. She reports current drug use. Drugs: \"Crack\" cocaine and Methamphetamines.    Home Medications:   carvedilol, famotidine, and gabapentin      Allergies:  No " "Known Allergies    Objective   Objective     Vitals:   Temp:  [97.3 °F (36.3 °C)-99.1 °F (37.3 °C)] 97.3 °F (36.3 °C)  Heart Rate:  [84-94] 94  Resp:  [16-20] 20  BP: (144-170)/() 148/107    Physical Exam:      CONSTITUTIONAL: Patient is well developed, well nourished, awake and alert.  HEENT: Head and neck are normocephalic and atraumatic.   LUNGS: Even unlabored respirations.  SKIN: Clean, dry, intact.  EXTREMITIES: No clubbing, cyanosis, edema.  MUSCULOSKELETAL: Symmetric body habitus. Spine straight. Strength intact,  NEUROLOGIC: Appropriate. No abnormal movements, good muscle tone.                              Cerebellar: station and gait steady.    Mental Status Exam:     Awake, alert, oriented female appears appropriate for stated age.  Sitting up on side of bed participates in interview.  She is oriented to person, month, day, year, place.  She appears to be of average intelligence and has a fairly reliable historian.       Hygiene:   good  Cooperation:  Cooperative  Eye Contact:  Good  Psychomotor Behavior:  Appropriate  Affect:  Restricted and Blunted  Mood: \"Rough\"  Speech:  Normal  Language: Appropriate  Thought Process:  Goal directed, Somatic  Thought Content:  Normal  Suicidal:  None  Homicidal:  None  Hallucinations:  None  Delusion:  None  Memory:  Intact  Orientation:  Person, Place, Time, and Situation  Reliability:  fair  Insight:  Fair  Judgement:  Fair  Impulse Control:  Fair        Result Review    Result Review:  I have personally reviewed the results from the time of this admission to 5/10/2024 10:39 EDT and agree with these findings:  [x]  Laboratory  []  Microbiology  []  Radiology  []  EKG/Telemetry   []  Cardiology/Vascular   []  Pathology  []  Old records  []  Other:  Most notable findings include: Low calcium    Assessment & Plan   Assessment / Plan     Brief Patient Summary:  Anjali Medina is a 58 y.o. female who was admitted on a 72-hour hold after being stabilized on the " medical side hospital    Active Hospital Problems:  Active Hospital Problems    Diagnosis    • **Psychosis        Plan:   Will have hospitalist to follow up with blood pressure control  Duloxetine for depression and secondarily to help with complaints of pain  Has been on Risperdal for psychosis   Admit for safety and stabilization and begin treatment for underlying mood disorder or psychosis with appropriate medications  Attempt to gain collateral information of possible  Work on safety plan  Provide supportive therapy  Patient to engage in all group and individual treatment modalities available including milieu therapy  Work on appropriate disposition follow-up  Estimated length of stay in hospital 4 to 5 days      DVT prophylaxis:  Mechanical DVT prophylaxis orders are present.        CODE STATUS:    Code Status (Patient has no pulse and is not breathing): CPR (Attempt to Resuscitate)  Medical Interventions (Patient has pulse or is breathing): Full Support      Admission Status:  I believe this patient meets inpatient status.      Part of this note may be an electronic transcription/translation of spoken language to printed text using the Dragon dictation system.        Electronically signed by Aaron Matias MD, 05/10/24, 10:39 AM EDT.    Electronically signed by Aaron Matias MD, 05/10/24, 10:48 AM EDT.

## 2024-05-10 NOTE — CONSULTS
"  [facility]   Hospitalist Consult Note  Date: 5/10/2024   Patient Name: Anjali Medina  : 1965  MRN: [id number]  Primary Care Physician:  Taina Cheema APRN  Referring Physician: Aaron Matias MD  Date of admission: 2024    Subjective   Subjective     Reason for Consult/ Chief Complaint: Hypertension    HPI:  Anjali Medina is a 58 y.o. female with hypertension, chronic benzodiazepine use, history of cocaine/methamphetamine use, depression who was admitted to psychiatric unit in setting of worsening depression, suicidal ideations and recent self-harm by cutting her wrist. UDS was positive for benzodiazepines and cocaine. She is prescribed Valium 5 mg every 8 hours as needed.  Transferred to inpatient care for increased somnolence and agitation.  Workup was essentially negative and she was thought to due to polypharmacy and/or benzodiazepine withdrawal.  Mental status improved and she was transferred back to [facility].  Hospitalist consulted for elevated blood pressures.    Review of Systems   All systems were reviewed and negative     Personal History     Past Medical History:  Past Medical History:   Diagnosis Date    Anxiety and depression     Back pain         Past Surgical History:  Past Surgical History:   Procedure Laterality Date    HYSTERECTOMY      SPINAL FUSION          Family History:   History reviewed. No pertinent family history.     Social History:   Social History     Socioeconomic History    Marital status:     Number of children: 2   Tobacco Use    Smoking status: Never    Smokeless tobacco: Never   Vaping Use    Vaping status: Never Used   Substance and Sexual Activity    Alcohol use: Not Currently    Drug use: Yes     Types: \"Crack\" cocaine, Methamphetamines    Sexual activity: Defer        Home Medications:  carvedilol, famotidine, and gabapentin    Allergies:  No Known Allergies        Objective    Objective     Vitals:   Temp:  [97.3 °F (36.3 °C)-98.1 °F " (36.7 °C)] 97.3 °F (36.3 °C)  Heart Rate:  [84-94] 94  Resp:  [16-20] 20  BP: (144-170)/() 148/107    Physical Exam:  Gen: NAD, Alert and Oriented  Cards: RRR, no murmur   Pulm: CTA b/l, no wheezing  Abd: soft, nondistended  Extremities: no pitting edema    Result Review    Result Review:  I have personally reviewed the results from the time of this admission to 5/10/2024 12:41 EDT and agree with these findings:  [x]  Laboratory  [x]  Microbiology  [x]  Radiology  []  EKG/Telemetry   []  Cardiology/Vascular   []  Pathology  []  Old records  []  Other:    Assessment & Plan   Assessment / Plan     Assessment:  Hypertension  Recent acute cystitis secondary to E. coli  Chronic benzodiazepine abuse  Stimulant abuse  Neuropathy on gabapentin  Recurrent MDD with suicidal ideation      Plan:  Continue inpatient psych care and [facility]  Continue home gabapentin 300 mg 3 times daily  Continue home Coreg 12.5 mg twice daily  Started on Cymbalta 30 mg daily and risperidone 2 mg daily by the psych team  Blood pressures elevated as high as 170 systolic, 148 systolic this morning  Start losartan 25 mg daily  Previous labs reviewed  Regular diet  Will continue to follow      Discussed with RN    DVT prophylaxis:  Mechanical DVT prophylaxis orders are present.        CODE STATUS:    Code Status (Patient has no pulse and is not breathing): CPR (Attempt to Resuscitate)  Medical Interventions (Patient has pulse or is breathing): Full Support      Electronically signed by Janki Schneider DO, 05/10/24, 12:41 PM EDT.

## 2024-05-11 LAB
ANION GAP SERPL CALCULATED.3IONS-SCNC: 14.1 MMOL/L (ref 5–15)
BUN SERPL-MCNC: 14 MG/DL (ref 6–20)
BUN/CREAT SERPL: 13.3 (ref 7–25)
CALCIUM SPEC-SCNC: 9.2 MG/DL (ref 8.6–10.5)
CHLORIDE SERPL-SCNC: 102 MMOL/L (ref 98–107)
CO2 SERPL-SCNC: 23.9 MMOL/L (ref 22–29)
CREAT SERPL-MCNC: 1.05 MG/DL (ref 0.57–1)
EGFRCR SERPLBLD CKD-EPI 2021: 61.7 ML/MIN/1.73
GLUCOSE SERPL-MCNC: 124 MG/DL (ref 65–99)
MAGNESIUM SERPL-MCNC: 1.8 MG/DL (ref 1.6–2.6)
POTASSIUM SERPL-SCNC: 3.5 MMOL/L (ref 3.5–5.2)
SODIUM SERPL-SCNC: 140 MMOL/L (ref 136–145)

## 2024-05-11 PROCEDURE — 83735 ASSAY OF MAGNESIUM: CPT | Performed by: STUDENT IN AN ORGANIZED HEALTH CARE EDUCATION/TRAINING PROGRAM

## 2024-05-11 PROCEDURE — 80048 BASIC METABOLIC PNL TOTAL CA: CPT | Performed by: STUDENT IN AN ORGANIZED HEALTH CARE EDUCATION/TRAINING PROGRAM

## 2024-05-11 PROCEDURE — 99232 SBSQ HOSP IP/OBS MODERATE 35: CPT | Performed by: STUDENT IN AN ORGANIZED HEALTH CARE EDUCATION/TRAINING PROGRAM

## 2024-05-11 RX ORDER — TRAZODONE HYDROCHLORIDE 50 MG/1
50 TABLET ORAL NIGHTLY PRN
Status: DISCONTINUED | OUTPATIENT
Start: 2024-05-11 | End: 2024-05-13 | Stop reason: HOSPADM

## 2024-05-11 RX ADMIN — RISPERIDONE 2 MG: 2 TABLET, FILM COATED ORAL at 20:07

## 2024-05-11 RX ADMIN — DULOXETINE HYDROCHLORIDE 30 MG: 30 CAPSULE, DELAYED RELEASE ORAL at 09:56

## 2024-05-11 RX ADMIN — TRAZODONE HYDROCHLORIDE 50 MG: 50 TABLET ORAL at 21:45

## 2024-05-11 RX ADMIN — GABAPENTIN 300 MG: 300 CAPSULE ORAL at 16:46

## 2024-05-11 RX ADMIN — RISPERIDONE 2 MG: 2 TABLET, FILM COATED ORAL at 09:56

## 2024-05-11 RX ADMIN — HYDROXYZINE PAMOATE 50 MG: 50 CAPSULE ORAL at 21:45

## 2024-05-11 RX ADMIN — NAPROXEN 500 MG: 250 TABLET ORAL at 17:51

## 2024-05-11 RX ADMIN — GABAPENTIN 300 MG: 300 CAPSULE ORAL at 09:56

## 2024-05-11 RX ADMIN — GABAPENTIN 300 MG: 300 CAPSULE ORAL at 20:07

## 2024-05-11 RX ADMIN — NAPROXEN 500 MG: 250 TABLET ORAL at 09:56

## 2024-05-11 NOTE — PLAN OF CARE
Goal Outcome Evaluation:    Pt is alert, oriented, not in distress. The patient has been observed in the dayroom interacting appropriately with peers and walking the unit with a steady but slow gait. PT denies current SI, HI or AVH. Pt rates her anxiety and depression 7/10. Pt is compliant with her schedule medications and asked for trazodone for sleep. Pt has a bell at bedside due to increase risk of falls. Pt verbalized understanding of how to use the bell.  Care of this patient is ongoing. -- AS RN

## 2024-05-11 NOTE — PROGRESS NOTES
[facility]   Hospitalist Progress Note  Date: 2024  Patient Name: Anjali Medina  : 1965  MRN: [id number]  Date of admission: 2024  Room/Bed: [facility]      Subjective   Subjective     Chief Complaint: Hypertension    Summary:Anjali Medina is a 58 y.o. female with hypertension, chronic benzodiazepine use, history of cocaine/methamphetamine use, depression who was admitted to psychiatric unit in setting of worsening depression, suicidal ideations and recent self-harm by cutting her wrist. UDS was positive for benzodiazepines and cocaine. She is prescribed Valium 5 mg every 8 hours as needed.  Transferred to inpatient care for increased somnolence and agitation.  Workup was essentially negative and she was thought to due to polypharmacy and/or benzodiazepine withdrawal.  Mental status improved and she was transferred back to [facility].  Hospitalist consulted for elevated blood pressures.     Interval Followup: No acute overnight events.  This morning she was noted to have hypotension, did receive a higher dose trazodone overnight.  BP med was held.  She reports little bit of dizziness when up and moving this morning.  Otherwise, she is having some muscle cramps in her calves.  Denies chest pain or shortness of breath.    Review of Systems    All systems reviewed and negative except for what is outlined above.      Objective   Objective     Vitals:   Temp:  [97.3 °F (36.3 °C)] 97.3 °F (36.3 °C)  Heart Rate:  [77-93] 77  Resp:  [16] 16  BP: ()/(67-94) 93/67    Physical Exam   Gen: NAD, Alert and Oriented  Pulm: No respiratory distress, can complete full sentences  Abd: soft, nondistended  Extremities: no pitting edema    Result Review    Result Review:  I have personally reviewed these results:  [x]  Laboratory      Lab 24  0500 24  0455 24  0502 24  0448   WBC 10.41 13.17* 13.57* 15.24*   HEMOGLOBIN 12.0 11.8* 11.9* 13.2   HEMATOCRIT 37.8 36.8 38.4 43.9    PLATELETS 261 271 287 348   NEUTROS ABS 4.16  --   --   --    EOS ABS 0.21  --   --   --    MCV 93.6 93.4 95.3 98.4*   SED RATE 33*  --   --   --    CRP  --  1.49*  --   --    PROCALCITONIN  --   --   --  0.15         Lab 05/08/24  0500 05/07/24  0455 05/06/24  0502 05/05/24  0448   SODIUM 138 140 144 147*   POTASSIUM 3.6 3.2* 3.5 3.4*   CHLORIDE 102 105 109* 110*   CO2 19.8* 21.5* 24.5 19.8*   ANION GAP 16.2* 13.5 10.5 17.2*   BUN 12 14 27* 28*   CREATININE 0.57 0.54* 0.74 1.20*   EGFR 105.5 106.9 93.9 52.6*   GLUCOSE 76 85 90 125*   CALCIUM 8.5* 8.4* 8.5* 9.1   MAGNESIUM  --  1.7  --  2.2   TSH  --  4.330*  --   --                      Lab 05/08/24  0500   VITAMIN B 12 264         Brief Urine Lab Results  (Last result in the past 365 days)        Color   Clarity   Blood   Leuk Est   Nitrite   Protein   CREAT   Urine HCG        05/05/24 1659 Yellow   Cloudy   Small (1+)   Trace   Negative   30 mg/dL (1+)                 [x]  Microbiology   Microbiology Results (last 10 days)       Procedure Component Value - Date/Time    Urine Culture - Urine, Straight Cath [[id number]]  (Abnormal)  (Susceptibility) Collected: 05/05/24 1659    Lab Status: Final result Specimen: Urine from Straight Cath Updated: 05/07/24 1007     Urine Culture >100,000 CFU/mL Escherichia coli    Narrative:      Colonization of the urinary tract without infection is common. Treatment is discouraged unless the patient is symptomatic, pregnant, or undergoing an invasive urologic procedure.    Susceptibility        Escherichia coli      ANTONINA      Amoxicillin + Clavulanate Intermediate      Ampicillin Resistant      Ampicillin + Sulbactam Resistant      Cefazolin Susceptible      Cefepime Susceptible      Ceftazidime Susceptible      Ceftriaxone Susceptible      Gentamicin Susceptible      Levofloxacin Resistant      Nitrofurantoin Susceptible      Piperacillin + Tazobactam Susceptible      Trimethoprim + Sulfamethoxazole Resistant                                  [x]  Radiology  CT Head Without Contrast    Result Date: 5/7/2024  Impression: No acute intracranial abnormality.    Electronically Signed By-Mak Moran MD On:5/7/2024 10:18 PM      XR Chest 1 View    Result Date: 5/5/2024  No active disease.   Electronically Signed By-PHILIP JOHNSON MD On:5/5/2024 6:02 PM     []  EKG/Telemetry   []  Cardiology/Vascular   []  Pathology  []  Old records  []  Other:    Assessment & Plan   Assessment / Plan     Assessment:  Hypertension  Recent acute cystitis secondary to E. coli  Chronic benzodiazepine abuse  Stimulant abuse  Neuropathy on gabapentin  Recurrent MDD with suicidal ideation      Plan:  Continue inpatient psych care and [facility]  Continue home gabapentin 300 mg 3 times daily  Continue home Coreg 12.5 mg twice daily  Continue Cymbalta 30 mg daily and risperidone 2 mg daily by the psych team  Received 100 mg trazodone last night with hypotension this morning.  Dose has been decreased to 50 mg.  Continue losartan 25 mg daily, okay to hold per parameters  Check BMP and mag for muscle cramping  Regular diet  Will continue to follow       Discussed with RN.    DVT prophylaxis:  Mechanical DVT prophylaxis orders are present.        CODE STATUS:   Code Status (Patient has no pulse and is not breathing): CPR (Attempt to Resuscitate)  Medical Interventions (Patient has pulse or is breathing): Full Support      Electronically signed by Janki Schneider DO, 5/11/2024, 11:41 EDT.

## 2024-05-11 NOTE — PROGRESS NOTES
Anjali Medina  58 y.o.  266/1  05/11/24  Aaron Matias MD    Subjective     Patient is seen and evaluated by me latest clinical data reviewed discussed with the staff.  Patient stated that her depression is better and she has been eating well her sleep is improved.  She however started to have multiple somatic complaints all the way pain and aches in her extremities and also something is popping in her ribs.  Patient stated that her potassium has been low and she is worried about it.  She has been compliant with the medication and no other problems    Objective     She reported her to anxiety and depression 7 out of 10 her blood pressure was low 93/67.  Patient's trazodone has been reduced to 50 mg instead of 100 because she might be suffering from some postural hypotension.  Other than that patient is stabilizing progressively.    Vitals:    05/11/24 0945   BP: 93/67   Pulse: 77   Resp: 16   Temp: 97.3 °F (36.3 °C)   SpO2: 98%       Result Review       Current Facility-Administered Medications:     acetaminophen (TYLENOL) tablet 650 mg, 650 mg, Oral, Q4H PRN, Aaron Matias MD, 650 mg at 05/10/24 1209    aluminum-magnesium hydroxide-simethicone (MAALOX MAX) 400-400-40 MG/5ML suspension 15 mL, 15 mL, Oral, Q6H PRN, Aaron Matias MD    carvedilol (COREG) tablet 12.5 mg, 12.5 mg, Oral, BID With Meals, Aaron Matias MD, 12.5 mg at 05/10/24 1740    haloperidol (HALDOL) tablet 5 mg, 5 mg, Oral, Q4H PRN **AND** diphenhydrAMINE (BENADRYL) capsule 50 mg, 50 mg, Oral, Q4H PRN, Aaron Matias MD    haloperidol lactate (HALDOL) injection 5 mg, 5 mg, Intramuscular, Q4H PRN **AND** diphenhydrAMINE (BENADRYL) injection 50 mg, 50 mg, Intramuscular, Q4H PRN, Aaron Matias MD    DULoxetine (CYMBALTA) DR capsule 30 mg, 30 mg, Oral, Daily, Aaron Matias MD, 30 mg at 05/11/24 0956    gabapentin (NEURONTIN) capsule 300 mg, 300 mg, Oral, TID, Aaron Matias MD, 300 mg at 05/11/24 0956    hydrOXYzine pamoate (VISTARIL) capsule 50 mg, 50 mg, Oral,  Q6H PRN, Aaron Matias MD    loperamide (IMODIUM) capsule 2 mg, 2 mg, Oral, Q2H PRN, Aaron Matias MD    losartan (COZAAR) tablet 25 mg, 25 mg, Oral, Q24H, Janki Schneider DO, 25 mg at 05/10/24 1258    magnesium hydroxide (MILK OF MAGNESIA) suspension 10 mL, 10 mL, Oral, Daily PRN, Aaron Matias MD    naproxen (NAPROSYN) tablet 500 mg, 500 mg, Oral, BID With Meals, Aaron Matias MD, 500 mg at 05/11/24 0956    nicotine (NICODERM CQ) 21 MG/24HR patch 1 patch, 1 patch, Transdermal, Daily PRN, Aaron Matias MD    risperiDONE (risperDAL) tablet 2 mg, 2 mg, Oral, BID, Aaron Matias MD, 2 mg at 05/11/24 0956    traZODone (DESYREL) tablet 50 mg, 50 mg, Oral, Nightly PRN, Carrie Gallo MD    Mental Status exam:    Appearance: Fair hygiene casually groomed  Concentration/Focus: Fair  Behaviors: Somatic complaints  Cognitive function: Alert and oriented x 3  Memory : Fairly good she knows all her somatic complaints with dates and time  Speech: Mumbling not very clear  Language: Normal  Mood : Fair  Affect: Labile  Thought process: Somewhat linear  Thought Content: Denies suicidal homicidal ideation denies auditory visual hallucination  Insight: Improved  Judgement: Fair      Assessment       Psychosis       Plan:     Continue current medications and treatment.  Monitor symptom resolution and encourage for compliance.  Encouraged to attend groups and participate  Collateral information for further care and disposition  Supportive psychotherapy is provided      Electronically signed by Carrie Gallo MD, 05/11/24, 12:54 PM EDT.

## 2024-05-11 NOTE — PLAN OF CARE
Goal Outcome Evaluation:  Plan of Care Reviewed With: patient  Patient Agreement with Plan of Care: agrees                                    Pt has been withdrawn to room for most of the day.  She denies SI/HI, AVH and contracts for safety. She rates anxiety and depression 7. She is able to make her needs known. Will continue to monitor.

## 2024-05-12 PROCEDURE — 99232 SBSQ HOSP IP/OBS MODERATE 35: CPT | Performed by: STUDENT IN AN ORGANIZED HEALTH CARE EDUCATION/TRAINING PROGRAM

## 2024-05-12 RX ADMIN — DULOXETINE HYDROCHLORIDE 30 MG: 30 CAPSULE, DELAYED RELEASE ORAL at 08:56

## 2024-05-12 RX ADMIN — RISPERIDONE 2 MG: 2 TABLET, FILM COATED ORAL at 20:29

## 2024-05-12 RX ADMIN — NAPROXEN 500 MG: 250 TABLET ORAL at 18:29

## 2024-05-12 RX ADMIN — ACETAMINOPHEN 650 MG: 325 TABLET ORAL at 13:13

## 2024-05-12 RX ADMIN — CARVEDILOL 12.5 MG: 3.12 TABLET, FILM COATED ORAL at 08:56

## 2024-05-12 RX ADMIN — GABAPENTIN 300 MG: 300 CAPSULE ORAL at 20:29

## 2024-05-12 RX ADMIN — GABAPENTIN 300 MG: 300 CAPSULE ORAL at 08:57

## 2024-05-12 RX ADMIN — GABAPENTIN 300 MG: 300 CAPSULE ORAL at 16:36

## 2024-05-12 RX ADMIN — CARVEDILOL 12.5 MG: 3.12 TABLET, FILM COATED ORAL at 18:29

## 2024-05-12 RX ADMIN — NAPROXEN 500 MG: 250 TABLET ORAL at 08:56

## 2024-05-12 RX ADMIN — LOSARTAN POTASSIUM 25 MG: 25 TABLET, FILM COATED ORAL at 08:57

## 2024-05-12 RX ADMIN — RISPERIDONE 2 MG: 2 TABLET, FILM COATED ORAL at 08:57

## 2024-05-12 NOTE — PLAN OF CARE
Goal Outcome Evaluation:  Plan of Care Reviewed With: patient  Patient Agreement with Plan of Care: agrees                                    Pt has been in room and on unit. She denies SI/HI, AVH and contracts for safety. She rates anxiety and depression 7.She is able to make her needs known. Will continue to monitor and provide safe environment.

## 2024-05-12 NOTE — PROGRESS NOTES
[facility]   Hospitalist Progress Note  Date: 2024  Patient Name: Anjali Medina  : 1965  MRN: [id number]  Date of admission: 2024  Room/Bed: [facility]      Subjective   Subjective     Chief Complaint: Hypertension    Summary:Anjali Medina is a 58 y.o. female with hypertension, chronic benzodiazepine use, history of cocaine/methamphetamine use, depression who was admitted to psychiatric unit in setting of worsening depression, suicidal ideations and recent self-harm by cutting her wrist. UDS was positive for benzodiazepines and cocaine. She is prescribed Valium 5 mg every 8 hours as needed.  Transferred to inpatient care for increased somnolence and agitation.  Workup was essentially negative and she was thought to due to polypharmacy and/or benzodiazepine withdrawal.  Mental status improved and she was transferred back to [facility].  Hospitalist consulted for elevated blood pressures.     Interval Followup: No acute overnight events.  No acute distress.  Patient was sitting at the bedside today.  She reports that she is doing well and that her muscle cramps she was having yesterday resolved.  She has had no more dizziness since yesterday.  Blood pressures are improved today.  Encouraged her to have p.o. intake.    Review of Systems    All systems reviewed and negative except for what is outlined above.      Objective   Objective     Vitals:   Temp:  [97.1 °F (36.2 °C)-97.5 °F (36.4 °C)] 97.1 °F (36.2 °C)  Heart Rate:  [] 91  Resp:  [18-20] 20  BP: (100-132)/(75-93) 113/85    Physical Exam   Gen: NAD, Alert and Oriented  Pulm: No respiratory distress, can complete full sentences  Abd: soft, nondistended  Extremities: no pitting edema    Result Review    Result Review:  I have personally reviewed these results:  [x]  Laboratory      Lab 24  0500 24  0455 24  0502   WBC 10.41 13.17* 13.57*   HEMOGLOBIN 12.0 11.8* 11.9*   HEMATOCRIT 37.8 36.8 38.4   PLATELETS 261 271  287   NEUTROS ABS 4.16  --   --    EOS ABS 0.21  --   --    MCV 93.6 93.4 95.3   SED RATE 33*  --   --    CRP  --  1.49*  --          Lab 05/11/24  1257 05/08/24  0500 05/07/24  0455   SODIUM 140 138 140   POTASSIUM 3.5 3.6 3.2*   CHLORIDE 102 102 105   CO2 23.9 19.8* 21.5*   ANION GAP 14.1 16.2* 13.5   BUN 14 12 14   CREATININE 1.05* 0.57 0.54*   EGFR 61.7 105.5 106.9   GLUCOSE 124* 76 85   CALCIUM 9.2 8.5* 8.4*   MAGNESIUM 1.8  --  1.7   TSH  --   --  4.330*                     Lab 05/08/24  0500   VITAMIN B 12 264         Brief Urine Lab Results  (Last result in the past 365 days)        Color   Clarity   Blood   Leuk Est   Nitrite   Protein   CREAT   Urine HCG        05/05/24 1659 Yellow   Cloudy   Small (1+)   Trace   Negative   30 mg/dL (1+)                 [x]  Microbiology   Microbiology Results (last 10 days)       Procedure Component Value - Date/Time    Urine Culture - Urine, Straight Cath [[id number]]  (Abnormal)  (Susceptibility) Collected: 05/05/24 1659    Lab Status: Final result Specimen: Urine from Straight Cath Updated: 05/07/24 1007     Urine Culture >100,000 CFU/mL Escherichia coli    Narrative:      Colonization of the urinary tract without infection is common. Treatment is discouraged unless the patient is symptomatic, pregnant, or undergoing an invasive urologic procedure.    Susceptibility        Escherichia coli      ANTONINA      Amoxicillin + Clavulanate Intermediate      Ampicillin Resistant      Ampicillin + Sulbactam Resistant      Cefazolin Susceptible      Cefepime Susceptible      Ceftazidime Susceptible      Ceftriaxone Susceptible      Gentamicin Susceptible      Levofloxacin Resistant      Nitrofurantoin Susceptible      Piperacillin + Tazobactam Susceptible      Trimethoprim + Sulfamethoxazole Resistant                                 [x]  Radiology  CT Head Without Contrast    Result Date: 5/7/2024  Impression: No acute intracranial abnormality.    Electronically Signed By-Mak  MD Luisa On:5/7/2024 10:18 PM      XR Chest 1 View    Result Date: 5/5/2024  No active disease.   Electronically Signed By-PHILIP JOHNSON MD On:5/5/2024 6:02 PM     []  EKG/Telemetry   []  Cardiology/Vascular   []  Pathology  []  Old records  []  Other:    Assessment & Plan   Assessment / Plan     Assessment:  Hypertension  Recent acute cystitis secondary to E. coli  Chronic benzodiazepine abuse  Stimulant abuse  Neuropathy on gabapentin  Recurrent MDD with suicidal ideation      Plan:  Continue inpatient psych care and [facility]  Continue home gabapentin 300 mg 3 times daily  Continue home Coreg 12.5 mg twice daily  Continue Cymbalta 30 mg daily and risperidone 2 mg daily by the psych team  Continue 50 mg trazodone HS   Continue losartan 25 mg daily, hold per parameters  Muscle cramping resolved  Noted to have been started on scheduled naproxen 500mg BID, would not continue this long term due to possible kidney injury/ulcer formation. Recommend transitioning to PO tylenol for chronic pain or diclofenac cream for arthritis   Regular diet      Medicine team will sign off at this time        Discussed with RN.    DVT prophylaxis:  Mechanical DVT prophylaxis orders are present.        CODE STATUS:   Code Status (Patient has no pulse and is not breathing): CPR (Attempt to Resuscitate)  Medical Interventions (Patient has pulse or is breathing): Full Support      Electronically signed by Janki Schneider DO, 5/12/2024, 10:37 EDT.

## 2024-05-12 NOTE — PLAN OF CARE
Goal Outcome Evaluation:    Pt is alert, oriented, not in distress. The patient was compliant with her medications. The patient rated her depression and anxiety 8/10. Pt denies current SI, HI or AVH. The patient has been observed interacting appropriately with her peers. Care of this patient is ongoing. -- AS RN

## 2024-05-12 NOTE — PROGRESS NOTES
Anjali Medina  58 y.o.  268/2  05/12/24  Aaron Matias MD    Subjective     Patient is seen and evaluated by me latest clinical data reviewed discussed with the staff.  Patient presented left less somatic this morning.  She is stating that she is fine just up and down.  Patient was not doing sun which was coming through the winter when she was more focused on that today.  She stated that she had concerns about Alzheimer's disease because she is missing on some information and talking and she cannot concentrate.  She also stated that grandfather had Alzheimer's and he was 72 years old.  She denies being suicidal and homicidal she presented better than yesterday but she is focused on other topics rather than what is asked for.    Objective     Staff reported that patient is somewhat bizarre in her interaction she rates her anxiety and depression at 7 denies all.  No other issues.  She has been compliant with the medication.    Vitals:    05/12/24 0746   BP: 113/85   Pulse: 91   Resp: 20   Temp: 97.1 °F (36.2 °C)   SpO2: 97%       Result Review       Current Facility-Administered Medications:     acetaminophen (TYLENOL) tablet 650 mg, 650 mg, Oral, Q4H PRN, Aaron Matias MD, 650 mg at 05/10/24 1209    aluminum-magnesium hydroxide-simethicone (MAALOX MAX) 400-400-40 MG/5ML suspension 15 mL, 15 mL, Oral, Q6H PRN, Aaron Matias MD    carvedilol (COREG) tablet 12.5 mg, 12.5 mg, Oral, BID With Meals, Aaron Matias MD, 12.5 mg at 05/12/24 0856    haloperidol (HALDOL) tablet 5 mg, 5 mg, Oral, Q4H PRN **AND** diphenhydrAMINE (BENADRYL) capsule 50 mg, 50 mg, Oral, Q4H PRN, Aaron Matias MD    haloperidol lactate (HALDOL) injection 5 mg, 5 mg, Intramuscular, Q4H PRN **AND** diphenhydrAMINE (BENADRYL) injection 50 mg, 50 mg, Intramuscular, Q4H PRN, Aaron Matias MD    DULoxetine (CYMBALTA) DR capsule 30 mg, 30 mg, Oral, Daily, Aaron Matias MD, 30 mg at 05/12/24 0856    gabapentin (NEURONTIN) capsule 300 mg, 300 mg, Oral, TID, Florencio,  MD Aaron, 300 mg at 05/12/24 0857    hydrOXYzine pamoate (VISTARIL) capsule 50 mg, 50 mg, Oral, Q6H PRN, Aaron Matias MD, 50 mg at 05/11/24 2145    loperamide (IMODIUM) capsule 2 mg, 2 mg, Oral, Q2H PRN, Aaron Matias MD    losartan (COZAAR) tablet 25 mg, 25 mg, Oral, Q24H, Janki Schneider DO, 25 mg at 05/12/24 0857    magnesium hydroxide (MILK OF MAGNESIA) suspension 10 mL, 10 mL, Oral, Daily PRN, Aaron Matias MD    naproxen (NAPROSYN) tablet 500 mg, 500 mg, Oral, BID With Meals, Aaron Matias MD, 500 mg at 05/12/24 0856    nicotine (NICODERM CQ) 21 MG/24HR patch 1 patch, 1 patch, Transdermal, Daily PRN, Aaron Matias MD    risperiDONE (risperDAL) tablet 2 mg, 2 mg, Oral, BID, Aaron Matias MD, 2 mg at 05/12/24 0857    traZODone (DESYREL) tablet 50 mg, 50 mg, Oral, Nightly PRN, Carrie Gallo MD, 50 mg at 05/11/24 2145    Mental Status exam:    Appearance: Patient is in bed casually groomed appears older than the stated age  Concentration/Focus: Improved  Behaviors: More cooperative  Cognitive function: Alert and oriented x 3  Memory : Impaired  Speech: Clear but details during conversation  Language: Normal  Mood : Up-and-down  Affect: Labile and sometimes bizarre  Thought process: With loosening of associations at times  Thought Content: Denies paranoia auditory visual hallucination denies being suicidal or homicidal  Insight: Limited  Judgement: Limited      Assessment   Major depressive disorder recurrent recent episode is moderate      Psychosis       Plan:     Continue current medications and treatment.  Patient is encouraged to participate in her treatment and attend groups  In the milieu specially dual diagnosis groups as well  Somatic complaints to be addressed  Supportive psychotherapy is provided to the patient      Electronically signed by Carrie Gallo MD, 05/12/24, 10:25 AM EDT.

## 2024-05-13 VITALS
BODY MASS INDEX: 33.94 KG/M2 | DIASTOLIC BLOOD PRESSURE: 97 MMHG | RESPIRATION RATE: 16 BRPM | TEMPERATURE: 97.7 F | OXYGEN SATURATION: 99 % | HEART RATE: 74 BPM | WEIGHT: 211.2 LBS | HEIGHT: 66 IN | SYSTOLIC BLOOD PRESSURE: 130 MMHG

## 2024-05-13 PROBLEM — M19.90 ARTHRITIS: Status: ACTIVE | Noted: 2024-05-13

## 2024-05-13 PROBLEM — F14.10 COCAINE ABUSE: Status: ACTIVE | Noted: 2024-05-13

## 2024-05-13 PROBLEM — F13.10 BENZODIAZEPINE ABUSE: Status: ACTIVE | Noted: 2024-05-13

## 2024-05-13 RX ORDER — CARVEDILOL 12.5 MG/1
12.5 TABLET ORAL 2 TIMES DAILY WITH MEALS
Qty: 60 TABLET | Refills: 1 | Status: SHIPPED | OUTPATIENT
Start: 2024-05-13

## 2024-05-13 RX ORDER — DULOXETIN HYDROCHLORIDE 30 MG/1
30 CAPSULE, DELAYED RELEASE ORAL DAILY
Qty: 30 CAPSULE | Refills: 1 | Status: SHIPPED | OUTPATIENT
Start: 2024-05-14

## 2024-05-13 RX ORDER — LOSARTAN POTASSIUM 25 MG/1
25 TABLET ORAL
Qty: 30 TABLET | Refills: 1 | Status: SHIPPED | OUTPATIENT
Start: 2024-05-14

## 2024-05-13 RX ORDER — RISPERIDONE 2 MG/1
2 TABLET ORAL 2 TIMES DAILY
Qty: 60 TABLET | Refills: 1 | Status: SHIPPED | OUTPATIENT
Start: 2024-05-13

## 2024-05-13 RX ORDER — DICLOFENAC SODIUM 75 MG/1
75 TABLET, DELAYED RELEASE ORAL 2 TIMES DAILY
Start: 2024-05-13

## 2024-05-13 RX ADMIN — DULOXETINE HYDROCHLORIDE 30 MG: 30 CAPSULE, DELAYED RELEASE ORAL at 08:13

## 2024-05-13 RX ADMIN — GABAPENTIN 300 MG: 300 CAPSULE ORAL at 08:13

## 2024-05-13 RX ADMIN — ACETAMINOPHEN 650 MG: 325 TABLET ORAL at 02:46

## 2024-05-13 RX ADMIN — NAPROXEN 500 MG: 250 TABLET ORAL at 08:13

## 2024-05-13 RX ADMIN — CARVEDILOL 12.5 MG: 3.12 TABLET, FILM COATED ORAL at 08:12

## 2024-05-13 RX ADMIN — LOSARTAN POTASSIUM 25 MG: 25 TABLET, FILM COATED ORAL at 08:13

## 2024-05-13 RX ADMIN — RISPERIDONE 2 MG: 2 TABLET, FILM COATED ORAL at 08:13

## 2024-05-13 NOTE — PLAN OF CARE
Goal Outcome Evaluation:  Plan of Care Reviewed With: patient  Patient Agreement with Plan of Care: agrees     Progress: no change     Patient rating anxiety 8/10 relating to Mothers Day and depression 8/10. Denies HI/SI. Denies AVH. Compliant with scheduled night time meds. PRN Tylenol administered for 8/10 painin back per patient request. Patient is calm and cooperative with nursing staff. Able to make needs known. Will continue plan of care and provide a safe patient environment.

## 2024-05-13 NOTE — PLAN OF CARE
Goal Outcome Evaluation:  Plan of Care Reviewed With: patient  Patient Agreement with Plan of Care: agrees   Patient has reached all goals and will be discharged from unit,. Patient to continue treatment on outpatient basis.

## 2024-05-13 NOTE — DISCHARGE SUMMARY
[facility]         DISCHARGE SUMMARY    Patient Name: Anjali Medina  : 1965  MRN: [id number]    Date of Admission: 2024  Date of Discharge: 2024  Primary Care Physician: Taina Cheema APRN    Consults       Date and Time Order Name Status Description    5/10/2024 10:59 AM Inpatient Hospitalist Consult              Presenting Problem:   Psychosis [F29]    Active and Resolved Hospital Problems:  Active Hospital Problems    Diagnosis POA   • **Psychosis [F29] Yes   • Arthritis [M19.90] Yes   • Benzodiazepine abuse [F13.10] Yes   • Cocaine abuse [F14.10] Unknown   • Obesity (BMI 30-39.9) [E66.9] Yes      Resolved Hospital Problems   No resolved problems to display.         Hospital Course     Hospital Course:  Anjali Medina is a 58 y.o. female with a long history of substance use, depression, arthritis, and hypertension that initially admitted to [facility] and was essentially nonresponsive.  Had some lab abnormalities.  To be medically compromised and was admitted to the medical side of the hospital.  Patient had delirium, psychosis, who was quite disorganized.  The patient did eventually improve.  She was treated for her hypertension on acute care side of the hospital and had her Coreg increased to 12.5 mg.    With her improved medical condition she was transferred to [facility].  She continued to show significant improvement.  She had gone from being noncommunicative, unsteady on her feet, and compromised medically and psychiatrically to being up, awake, alert, out in the milieu with peers and back to baseline.    Patient does report a long history of depression and treatment with multiple other medications.  With her chronic pain issues she was started on duloxetine 30 mg daily and she tolerated this medication well.    Patient was psychotic when he came into the hospital and then started on Risperdal 2 mg twice daily.  Her psychosis abated and her thought  "processes improved and she will be continued on this medication at discharge.  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    Patient with long history of substance use and has been to rehab on occasions and did not feel she wanted to go back to drug and alcohol rehabilitation.  She is calm and cooperative.  He has been free of suicidal ideations.  Mood and affect of significant improved.  She is requesting discharge at this point is stable and may be discharged home.    Patient was followed by the medicine team and had losartan added to her regimen of blood pressure medicines.  She will need follow-up with primary care for continued treatment of blood pressure as well as chronic pain issues which she has been on diclofenac for.      On day of discharge patient is calm, cooperative, engaging, and has no acute agitation or restlessness.  Speech is normal and language is..  Mood is described as \"pretty good\" and affect is congruent with stated mood.  Thought processes are linear, and thought content is negative for suicidal homicidal ideation, no hallucinations.  Insight is improved, good, and judgment is intact.      DISCHARGE Follow Up Recommendations for labs and diagnostics: Drug and alcohol rehabilitation, primary care for routine health maintenance, [facility]      Day of Discharge     Vital Signs:  Temp:  [97.7 °F (36.5 °C)-98.4 °F (36.9 °C)] 97.7 °F (36.5 °C)  Heart Rate:  [72-74] 74  Resp:  [16-18] 16  BP: (111-130)/(95-97) 130/97      Pertinent  and/or Most Recent Results     LAB RESULTS:      Lab 05/08/24  0500 05/07/24  0455   WBC 10.41 13.17*   HEMOGLOBIN 12.0 11.8*   HEMATOCRIT 37.8 36.8   PLATELETS 261 271   NEUTROS ABS 4.16  --    EOS ABS 0.21  --    MCV 93.6 93.4   SED RATE 33*  --    CRP  --  1.49*         Lab 05/11/24  1257 05/08/24  0500 " 05/07/24  0455   SODIUM 140 138 140   POTASSIUM 3.5 3.6 3.2*   CHLORIDE 102 102 105   CO2 23.9 19.8* 21.5*   ANION GAP 14.1 16.2* 13.5   BUN 14 12 14   CREATININE 1.05* 0.57 0.54*   EGFR 61.7 105.5 106.9   GLUCOSE 124* 76 85   CALCIUM 9.2 8.5* 8.4*   MAGNESIUM 1.8  --  1.7   TSH  --   --  4.330*                     Lab 05/08/24  0500   VITAMIN B 12 264                             Brief Urine Lab Results  (Last result in the past 365 days)        Color   Clarity   Blood   Leuk Est   Nitrite   Protein   CREAT   Urine HCG        05/05/24 1659 Yellow   Cloudy   Small (1+)   Trace   Negative   30 mg/dL (1+)                      CT Head Without Contrast    Result Date: 5/7/2024  Impression: Impression: No acute intracranial abnormality.    Electronically Signed By-Mak Moran MD On:5/7/2024 10:18 PM      XR Chest 1 View    Result Date: 5/5/2024  Impression: No active disease.   Electronically Signed By-PHILIP JOHNSON MD On:5/5/2024 6:02 PM                   Imaging Results (Last 7 Days)       ** No results found for the last 168 hours. **             Labs Pending at Discharge:           Discharge Details        Discharge Medications        New Medications        Instructions Start Date   DULoxetine 30 MG capsule  Commonly known as: CYMBALTA   30 mg, Oral, Daily   Start Date: May 14, 2024     losartan 25 MG tablet  Commonly known as: COZAAR   25 mg, Oral, Every 24 Hours Scheduled   Start Date: May 14, 2024     risperiDONE 2 MG tablet  Commonly known as: risperDAL   2 mg, Oral, 2 Times Daily             Changes to Medications        Instructions Start Date   carvedilol 12.5 MG tablet  Commonly known as: COREG  What changed:   medication strength  how much to take   12.5 mg, Oral, 2 Times Daily With Meals             Continue These Medications        Instructions Start Date   diclofenac 75 MG EC tablet  Commonly known as: VOLTAREN   75 mg, Oral, 2 Times Daily      famotidine 20 MG tablet  Commonly known as: PEPCID   20 mg,  Oral, 2 Times Daily      gabapentin 300 MG capsule  Commonly known as: NEURONTIN   Take 1 capsule by mouth 3 (Three) Times a Day. Indications: Neuropathic Pain             Stop These Medications      ARIPiprazole 10 MG tablet  Commonly known as: ABILIFY              No Known Allergies      Discharge Disposition:  Home or Self Care    Diet:  Hospital:  Diet Order   Procedures   • Diet: Regular/House; Fluid Consistency: Thin (IDDSI 0)         Discharge Activity: Ad clarissa.  Activity Instructions       Activity as Tolerated              Discharge Condition: Stable    CODE STATUS:  Code Status and Medical Interventions:   Ordered at: 05/09/24 1708     Code Status (Patient has no pulse and is not breathing):    CPR (Attempt to Resuscitate)     Medical Interventions (Patient has pulse or is breathing):    Full Support         Future Appointments   Date Time Provider Department Center   5/20/2024  2:45 PM [facility] BLAYNE MRI 1  RIK ETWMR [facility]   5/28/2024 10:30 AM Bradley Adler MD [facility] NS ETOWN [facility]   8/21/2024 11:15 AM Stephanie Fletcher APRN [facility] MARI ETWN [facility]       Additional Instructions for the Follow-ups that You Need to Schedule       Discharge Follow-up with PCP   As directed       Currently Documented PCP:    Taina Cheema APRN    PCP Phone Number:    [phone]     Follow Up Details: As scheduled        Discharge Follow-up with Specified Provider: [facility]   As directed      To: [facility]                Time spent on Discharge including face to face service: 40 minutes    Part of this note may be an electronic transcription/translation of spoken language to printed text using the Dragon dictation system.        Electronically signed by Aaron Matias MD, 05/13/24, 11:51 AM EDT.

## 2024-05-17 ENCOUNTER — TRANSCRIBE ORDERS (OUTPATIENT)
Dept: ADMINISTRATIVE | Facility: HOSPITAL | Age: 59
End: 2024-05-17
Payer: COMMERCIAL

## 2024-05-17 DIAGNOSIS — Z12.31 SCREENING MAMMOGRAM FOR HIGH-RISK PATIENT: Primary | ICD-10-CM

## 2024-05-20 ENCOUNTER — HOSPITAL ENCOUNTER (OUTPATIENT)
Dept: MRI IMAGING | Facility: HOSPITAL | Age: 59
Discharge: HOME | End: 2024-05-20
Admitting: NURSE PRACTITIONER
Payer: COMMERCIAL

## 2024-05-20 DIAGNOSIS — M51.36 DDD (DEGENERATIVE DISC DISEASE), LUMBAR: ICD-10-CM

## 2024-05-20 PROCEDURE — 72148 MRI LUMBAR SPINE W/O DYE: CPT

## 2024-05-28 ENCOUNTER — OFFICE VISIT (OUTPATIENT)
Dept: NEUROSURGERY | Facility: CLINIC | Age: 59
End: 2024-05-28
Payer: COMMERCIAL

## 2024-05-28 VITALS
BODY MASS INDEX: 35.72 KG/M2 | DIASTOLIC BLOOD PRESSURE: 96 MMHG | WEIGHT: 222.3 LBS | HEIGHT: 66 IN | SYSTOLIC BLOOD PRESSURE: 165 MMHG

## 2024-05-28 DIAGNOSIS — M47.817 LUMBOSACRAL SPONDYLOSIS WITHOUT MYELOPATHY: ICD-10-CM

## 2024-05-28 DIAGNOSIS — M54.42 CHRONIC MIDLINE LOW BACK PAIN WITH LEFT-SIDED SCIATICA: Primary | ICD-10-CM

## 2024-05-28 DIAGNOSIS — G89.29 CHRONIC MIDLINE LOW BACK PAIN WITH LEFT-SIDED SCIATICA: Primary | ICD-10-CM

## 2024-05-28 RX ORDER — AMITRIPTYLINE HYDROCHLORIDE 25 MG/1
25 TABLET, FILM COATED ORAL NIGHTLY
COMMUNITY
End: 2024-05-28

## 2024-05-28 RX ORDER — DIAZEPAM 5 MG/1
5 TABLET ORAL 2 TIMES DAILY PRN
COMMUNITY

## 2024-05-28 RX ORDER — VENLAFAXINE HYDROCHLORIDE 150 MG/1
150 CAPSULE, EXTENDED RELEASE ORAL DAILY
COMMUNITY

## 2024-05-28 RX ORDER — TRAMADOL HYDROCHLORIDE 50 MG/1
50 TABLET ORAL EVERY 8 HOURS PRN
Qty: 30 TABLET | Refills: 1 | Status: SHIPPED | OUTPATIENT
Start: 2024-05-28

## 2024-05-28 RX ORDER — TIZANIDINE 4 MG/1
4 TABLET ORAL NIGHTLY PRN
COMMUNITY

## 2024-05-28 NOTE — PROGRESS NOTES
Anjali Medina is a 58 y.o. female that presents with Back Pain       She has had lower back pain for years. The pain radiates into the left leg with some numbness into the first 2 toes. The back pain is greater than the leg pain. Her back pain is some better with heat. NSAIDs can no longer be tolerated. Gabapentin and muscle relaxants don't help. She has done PT and injections previously with minimal help. She finds herself changing positions frequently.     Back Pain      Review of Systems   Musculoskeletal:  Positive for back pain and myalgias.        Vitals:    05/28/24 1030   BP: 165/96        Physical Exam  Constitutional:       Comments: BMI 35.9   Cardiovascular:      Comments: No notable edema   Pulmonary:      Effort: Pulmonary effort is normal.   Neurological:      Mental Status: She is alert.      Sensory: Sensory deficit (decreased to light touch in the left L5 and S1 dist) present.      Motor: No weakness.   Psychiatric:         Mood and Affect: Mood normal.        I personally interpreted the patient's MRI scan which shows multilevel lumbar spondylosis without significant spinal stenosis. There may be some lateral recess stenosis at this level.     Assessment and Plan {CC Problem List  Visit Diagnosis  ROS  Review (Popup)  [facility] Maintenance  Quality  BestPractice  Medications  SmartSets  SnapShot Encounters  Media :23}   Problem List Items Addressed This Visit    None  Visit Diagnoses       Chronic midline low back pain with left-sided sciatica    -  Primary    Relevant Medications    traMADol (ULTRAM) 50 MG tablet    Lumbosacral spondylosis without myelopathy            She would not likely benefit from lower back surgery.     I will add tramadol for the lower back pain.     A TENS unit could potentially help reduce the pain. Patients who like heat often benefit capsaicin ointment and patients who like cold often benefit from Biofreeze.    She could consider a spinal cord stimulator  trial for the back and leg pain.     We discussed the importance of core strengthening, avoidance of activities that worsen the pain, nicotine cessation and maintenance of healthy weight. Surgery for patients with multilevel degenerative disc disease is not typically successful with the risks outweighing the benefit.     Follow Up {Instructions Charge Capture  Follow-up Communications :23}   No follow-ups on file.

## 2024-08-07 ENCOUNTER — TELEPHONE (OUTPATIENT)
Dept: CARDIOLOGY | Facility: CLINIC | Age: 59
End: 2024-08-07
Payer: COMMERCIAL

## 2024-08-07 NOTE — TELEPHONE ENCOUNTER
SILVERIO PT.  PT STATED SHE IS TAKING CARVEDILOL 6.25 MG BID BUT HAS PLENTY AND DOES NOT NEED A REFILL AT THIS TIME.

## 2024-08-07 NOTE — TELEPHONE ENCOUNTER
The [facility] received a fax that requires your attention. The document has been indexed to the patient’s chart for your review.      Reason for sending: EXTERNAL MEDICAL RECORD NOTIFICATION     Documents Description: CARVEDIOLOL NEW RX REQ-[id number]    Name of Sender: [facility]     Date Indexed: 8.6.24

## 2024-08-07 NOTE — TELEPHONE ENCOUNTER
LOOKS AS IF MEDICATION WAS D/C AND IT IS NOT ON CURRENT MED LIST.  TRIED TO CALL PT WITH NO ANSWER.

## 2024-09-09 ENCOUNTER — TELEPHONE (OUTPATIENT)
Dept: CARDIOLOGY | Facility: CLINIC | Age: 59
End: 2024-09-09
Payer: MEDICARE

## 2024-09-09 ENCOUNTER — TELEPHONE (OUTPATIENT)
Dept: NEUROSURGERY | Facility: CLINIC | Age: 59
End: 2024-09-09
Payer: MEDICARE

## 2024-09-09 NOTE — TELEPHONE ENCOUNTER
The [facility] received a fax that requires your attention. The document has been indexed to the patient’s chart for your review.      Reason for sending: EXTERNAL MEDICAL RECORD NOTIFICATION     Documents Description: CARVEDILOL RX REQ-[id number]    Name of Sender: [facility]     Date Indexed: 9.8.24

## 2024-09-09 NOTE — TELEPHONE ENCOUNTER
The [facility] received a fax that requires your attention. The document has been indexed to the patient’s chart for your review.      Reason for sending: PHARMACY CHANGE    Documents Description: REQUEST FOR PRESCRIPTION WITH NEW PHARMACY 9/3/24    Name of Sender: [facility]    Date Indexed: 9/9/24

## 2024-09-12 ENCOUNTER — OFFICE VISIT (OUTPATIENT)
Dept: NEUROSURGERY | Facility: CLINIC | Age: 59
End: 2024-09-12
Payer: MEDICARE

## 2024-09-12 VITALS
BODY MASS INDEX: 36.35 KG/M2 | DIASTOLIC BLOOD PRESSURE: 85 MMHG | SYSTOLIC BLOOD PRESSURE: 156 MMHG | HEIGHT: 66 IN | WEIGHT: 226.2 LBS

## 2024-09-12 DIAGNOSIS — M54.42 CHRONIC MIDLINE LOW BACK PAIN WITH LEFT-SIDED SCIATICA: ICD-10-CM

## 2024-09-12 DIAGNOSIS — G89.29 CHRONIC MIDLINE LOW BACK PAIN WITH LEFT-SIDED SCIATICA: ICD-10-CM

## 2024-09-12 DIAGNOSIS — M47.817 LUMBOSACRAL SPONDYLOSIS WITHOUT MYELOPATHY: Primary | ICD-10-CM

## 2024-09-12 PROCEDURE — 1160F RVW MEDS BY RX/DR IN RCRD: CPT | Performed by: NEUROLOGICAL SURGERY

## 2024-09-12 PROCEDURE — 1159F MED LIST DOCD IN RCRD: CPT | Performed by: NEUROLOGICAL SURGERY

## 2024-09-12 PROCEDURE — 99213 OFFICE O/P EST LOW 20 MIN: CPT | Performed by: NEUROLOGICAL SURGERY

## 2024-09-12 RX ORDER — TRAMADOL HYDROCHLORIDE 50 MG/1
50 TABLET ORAL EVERY 8 HOURS PRN
Qty: 30 TABLET | Refills: 1 | Status: SHIPPED | OUTPATIENT
Start: 2024-09-12

## 2024-09-12 NOTE — PROGRESS NOTES
Anjali Medina is a 59 y.o. female that presents with Back Pain       She has been falling more often as both legs goes numb intermittently. The back pain is greater than the leg pain. She has some pain in the left shoulder also.     Her symptoms are worse with bending and walking up stairs. She can't lay flat on her back or extend her back. She had an ablation about 1 1/2 years ago.     Review of Systems   Musculoskeletal:  Positive for back pain and myalgias.        Vitals:    09/12/24 1310   BP: 156/85        Physical Exam  Constitutional:       Comments: BMI 36.5   Pulmonary:      Effort: Pulmonary effort is normal.   Neurological:      Mental Status: She is alert.      Sensory: No sensory deficit.      Motor: No weakness.   Psychiatric:         Mood and Affect: Mood normal.             Assessment and Plan {CC Problem List  Visit Diagnosis  ROS  Review (Popup)  [facility] Maintenance  Quality  BestPractice  Medications  SmartSets  SnapShot Encounters  Media :23}   Problem List Items Addressed This Visit    None  Visit Diagnoses       Lumbosacral spondylosis without myelopathy    -  Primary    Relevant Orders    Ambulatory Referral to Pain Management Clinic (Completed)    Chronic midline low back pain with left-sided sciatica        Relevant Orders    Ambulatory Referral to Pain Management Clinic (Completed)        She could potentially benefit from an SCS trial for the back and leg pain. I don't feel surgery would likely help her current symptoms.     We discussed the importance of core strengthening, avoidance of activities that worsen the pain, and maintenance of healthy weight. Surgery for patients with multilevel degenerative disc disease is not typically successful with the risks outweighing the benefit.      Follow Up {Instructions Charge Capture  Follow-up Communications :23}   No follow-ups on file.